# Patient Record
Sex: FEMALE | Race: OTHER | NOT HISPANIC OR LATINO
[De-identification: names, ages, dates, MRNs, and addresses within clinical notes are randomized per-mention and may not be internally consistent; named-entity substitution may affect disease eponyms.]

---

## 2021-02-07 ENCOUNTER — TRANSCRIPTION ENCOUNTER (OUTPATIENT)
Age: 36
End: 2021-02-07

## 2021-03-27 ENCOUNTER — APPOINTMENT (OUTPATIENT)
Dept: PLASTIC SURGERY | Facility: CLINIC | Age: 36
End: 2021-03-27
Payer: COMMERCIAL

## 2021-03-27 PROCEDURE — XXXXX: CPT

## 2021-03-29 PROBLEM — Z00.00 ENCOUNTER FOR PREVENTIVE HEALTH EXAMINATION: Status: ACTIVE | Noted: 2021-03-29

## 2021-04-08 ENCOUNTER — TRANSCRIPTION ENCOUNTER (OUTPATIENT)
Age: 36
End: 2021-04-08

## 2021-04-10 ENCOUNTER — APPOINTMENT (OUTPATIENT)
Dept: PLASTIC SURGERY | Facility: CLINIC | Age: 36
End: 2021-04-10
Payer: COMMERCIAL

## 2021-04-10 DIAGNOSIS — M20.009 UNSPECIFIED DEFORMITY OF UNSPECIFIED FINGER(S): ICD-10-CM

## 2021-04-10 DIAGNOSIS — Q70.9 SYNDACTYLY, UNSPECIFIED: ICD-10-CM

## 2021-04-10 PROCEDURE — XXXXX: CPT

## 2021-04-23 ENCOUNTER — TRANSCRIPTION ENCOUNTER (OUTPATIENT)
Age: 36
End: 2021-04-23

## 2022-01-03 ENCOUNTER — TRANSCRIPTION ENCOUNTER (OUTPATIENT)
Age: 37
End: 2022-01-03

## 2022-06-17 ENCOUNTER — OUTPATIENT (OUTPATIENT)
Dept: OUTPATIENT SERVICES | Facility: HOSPITAL | Age: 37
LOS: 1 days | End: 2022-06-17
Payer: MEDICAID

## 2022-06-17 DIAGNOSIS — Z3A.00 WEEKS OF GESTATION OF PREGNANCY NOT SPECIFIED: ICD-10-CM

## 2022-06-17 DIAGNOSIS — O26.899 OTHER SPECIFIED PREGNANCY RELATED CONDITIONS, UNSPECIFIED TRIMESTER: ICD-10-CM

## 2022-06-17 PROCEDURE — G0463: CPT

## 2022-06-17 PROCEDURE — 76815 OB US LIMITED FETUS(S): CPT

## 2022-06-17 PROCEDURE — 59025 FETAL NON-STRESS TEST: CPT

## 2022-06-17 PROCEDURE — 76815 OB US LIMITED FETUS(S): CPT | Mod: 26

## 2022-06-17 PROCEDURE — 76819 FETAL BIOPHYS PROFIL W/O NST: CPT | Mod: 26

## 2022-06-17 PROCEDURE — 76819 FETAL BIOPHYS PROFIL W/O NST: CPT

## 2022-06-20 ENCOUNTER — INPATIENT (INPATIENT)
Facility: HOSPITAL | Age: 37
LOS: 2 days | Discharge: HOME | End: 2022-06-23
Attending: OBSTETRICS & GYNECOLOGY | Admitting: OBSTETRICS & GYNECOLOGY
Payer: MEDICAID

## 2022-06-20 VITALS — HEIGHT: 64 IN | WEIGHT: 167.55 LBS

## 2022-06-20 DIAGNOSIS — O26.899 OTHER SPECIFIED PREGNANCY RELATED CONDITIONS, UNSPECIFIED TRIMESTER: ICD-10-CM

## 2022-06-20 DIAGNOSIS — Z3A.00 WEEKS OF GESTATION OF PREGNANCY NOT SPECIFIED: ICD-10-CM

## 2022-06-20 DIAGNOSIS — Z34.80 ENCOUNTER FOR SUPERVISION OF OTHER NORMAL PREGNANCY, UNSPECIFIED TRIMESTER: ICD-10-CM

## 2022-06-20 LAB
BASOPHILS # BLD AUTO: 0.03 K/UL — SIGNIFICANT CHANGE UP (ref 0–0.2)
BASOPHILS NFR BLD AUTO: 0.4 % — SIGNIFICANT CHANGE UP (ref 0–2)
EOSINOPHIL # BLD AUTO: 0.14 K/UL — SIGNIFICANT CHANGE UP (ref 0–0.5)
EOSINOPHIL NFR BLD AUTO: 1.9 % — SIGNIFICANT CHANGE UP (ref 0–6)
HCT VFR BLD CALC: 36.3 % — SIGNIFICANT CHANGE UP (ref 34.5–45)
HGB BLD-MCNC: 12.6 G/DL — SIGNIFICANT CHANGE UP (ref 11.5–15.5)
IMM GRANULOCYTES NFR BLD AUTO: 0.6 % — SIGNIFICANT CHANGE UP (ref 0–1.5)
LYMPHOCYTES # BLD AUTO: 2.14 K/UL — SIGNIFICANT CHANGE UP (ref 1–3.3)
LYMPHOCYTES # BLD AUTO: 29.7 % — SIGNIFICANT CHANGE UP (ref 13–44)
MCHC RBC-ENTMCNC: 33.1 PG — SIGNIFICANT CHANGE UP (ref 27–34)
MCHC RBC-ENTMCNC: 34.7 GM/DL — SIGNIFICANT CHANGE UP (ref 32–36)
MCV RBC AUTO: 95.3 FL — SIGNIFICANT CHANGE UP (ref 80–100)
MONOCYTES # BLD AUTO: 0.58 K/UL — SIGNIFICANT CHANGE UP (ref 0–0.9)
MONOCYTES NFR BLD AUTO: 8.1 % — SIGNIFICANT CHANGE UP (ref 2–14)
NEUTROPHILS # BLD AUTO: 4.27 K/UL — SIGNIFICANT CHANGE UP (ref 1.8–7.4)
NEUTROPHILS NFR BLD AUTO: 59.3 % — SIGNIFICANT CHANGE UP (ref 43–77)
NRBC # BLD: 0 /100 WBCS — SIGNIFICANT CHANGE UP (ref 0–0)
PLATELET # BLD AUTO: 187 K/UL — SIGNIFICANT CHANGE UP (ref 150–400)
RBC # BLD: 3.81 M/UL — SIGNIFICANT CHANGE UP (ref 3.8–5.2)
RBC # FLD: 13.4 % — SIGNIFICANT CHANGE UP (ref 10.3–14.5)
SARS-COV-2 RNA SPEC QL NAA+PROBE: SIGNIFICANT CHANGE UP
WBC # BLD: 7.2 K/UL — SIGNIFICANT CHANGE UP (ref 3.8–10.5)
WBC # FLD AUTO: 7.2 K/UL — SIGNIFICANT CHANGE UP (ref 3.8–10.5)

## 2022-06-20 RX ORDER — SODIUM CHLORIDE 9 MG/ML
1000 INJECTION, SOLUTION INTRAVENOUS
Refills: 0 | Status: DISCONTINUED | OUTPATIENT
Start: 2022-06-20 | End: 2022-06-21

## 2022-06-20 RX ORDER — OXYTOCIN 10 UNIT/ML
VIAL (ML) INJECTION
Qty: 30 | Refills: 0 | Status: DISCONTINUED | OUTPATIENT
Start: 2022-06-20 | End: 2022-06-21

## 2022-06-20 RX ORDER — CITRIC ACID/SODIUM CITRATE 300-500 MG
15 SOLUTION, ORAL ORAL EVERY 6 HOURS
Refills: 0 | Status: DISCONTINUED | OUTPATIENT
Start: 2022-06-20 | End: 2022-06-21

## 2022-06-20 RX ADMIN — SODIUM CHLORIDE 125 MILLILITER(S): 9 INJECTION, SOLUTION INTRAVENOUS at 22:00

## 2022-06-21 LAB
APTT BLD: 28.5 SEC — SIGNIFICANT CHANGE UP (ref 27.5–35.5)
BASOPHILS # BLD AUTO: 0.04 K/UL — SIGNIFICANT CHANGE UP (ref 0–0.2)
BASOPHILS NFR BLD AUTO: 0.4 % — SIGNIFICANT CHANGE UP (ref 0–2)
BLD GP AB SCN SERPL QL: SIGNIFICANT CHANGE UP
EOSINOPHIL # BLD AUTO: 0.09 K/UL — SIGNIFICANT CHANGE UP (ref 0–0.5)
EOSINOPHIL NFR BLD AUTO: 0.9 % — SIGNIFICANT CHANGE UP (ref 0–6)
FIBRINOGEN PPP-MCNC: 591 MG/DL — HIGH (ref 340–550)
HCT VFR BLD CALC: 36.8 % — SIGNIFICANT CHANGE UP (ref 34.5–45)
HGB BLD-MCNC: 12.7 G/DL — SIGNIFICANT CHANGE UP (ref 11.5–15.5)
IMM GRANULOCYTES NFR BLD AUTO: 0.4 % — SIGNIFICANT CHANGE UP (ref 0–1.5)
INR BLD: 0.88 RATIO — SIGNIFICANT CHANGE UP (ref 0.88–1.16)
LYMPHOCYTES # BLD AUTO: 2.07 K/UL — SIGNIFICANT CHANGE UP (ref 1–3.3)
LYMPHOCYTES # BLD AUTO: 21.7 % — SIGNIFICANT CHANGE UP (ref 13–44)
MCHC RBC-ENTMCNC: 32.6 PG — SIGNIFICANT CHANGE UP (ref 27–34)
MCHC RBC-ENTMCNC: 34.5 GM/DL — SIGNIFICANT CHANGE UP (ref 32–36)
MCV RBC AUTO: 94.6 FL — SIGNIFICANT CHANGE UP (ref 80–100)
MONOCYTES # BLD AUTO: 0.63 K/UL — SIGNIFICANT CHANGE UP (ref 0–0.9)
MONOCYTES NFR BLD AUTO: 6.6 % — SIGNIFICANT CHANGE UP (ref 2–14)
NEUTROPHILS # BLD AUTO: 6.68 K/UL — SIGNIFICANT CHANGE UP (ref 1.8–7.4)
NEUTROPHILS NFR BLD AUTO: 70 % — SIGNIFICANT CHANGE UP (ref 43–77)
NRBC # BLD: 0 /100 WBCS — SIGNIFICANT CHANGE UP (ref 0–0)
PLATELET # BLD AUTO: 171 K/UL — SIGNIFICANT CHANGE UP (ref 150–400)
PROTHROM AB SERPL-ACNC: 10.5 SEC — SIGNIFICANT CHANGE UP (ref 10.5–13.4)
RBC # BLD: 3.89 M/UL — SIGNIFICANT CHANGE UP (ref 3.8–5.2)
RBC # FLD: 13.3 % — SIGNIFICANT CHANGE UP (ref 10.3–14.5)
T PALLIDUM AB TITR SER: NEGATIVE — SIGNIFICANT CHANGE UP
WBC # BLD: 9.55 K/UL — SIGNIFICANT CHANGE UP (ref 3.8–10.5)
WBC # FLD AUTO: 9.55 K/UL — SIGNIFICANT CHANGE UP (ref 3.8–10.5)

## 2022-06-21 RX ORDER — DIPHENHYDRAMINE HCL 50 MG
25 CAPSULE ORAL EVERY 6 HOURS
Refills: 0 | Status: DISCONTINUED | OUTPATIENT
Start: 2022-06-21 | End: 2022-06-23

## 2022-06-21 RX ORDER — KETOROLAC TROMETHAMINE 30 MG/ML
30 SYRINGE (ML) INJECTION ONCE
Refills: 0 | Status: DISCONTINUED | OUTPATIENT
Start: 2022-06-21 | End: 2022-06-21

## 2022-06-21 RX ORDER — DIBUCAINE 1 %
1 OINTMENT (GRAM) RECTAL EVERY 6 HOURS
Refills: 0 | Status: DISCONTINUED | OUTPATIENT
Start: 2022-06-21 | End: 2022-06-23

## 2022-06-21 RX ORDER — ASCORBIC ACID 60 MG
500 TABLET,CHEWABLE ORAL DAILY
Refills: 0 | Status: DISCONTINUED | OUTPATIENT
Start: 2022-06-21 | End: 2022-06-23

## 2022-06-21 RX ORDER — TETANUS TOXOID, REDUCED DIPHTHERIA TOXOID AND ACELLULAR PERTUSSIS VACCINE, ADSORBED 5; 2.5; 8; 8; 2.5 [IU]/.5ML; [IU]/.5ML; UG/.5ML; UG/.5ML; UG/.5ML
0.5 SUSPENSION INTRAMUSCULAR ONCE
Refills: 0 | Status: DISCONTINUED | OUTPATIENT
Start: 2022-06-21 | End: 2022-06-23

## 2022-06-21 RX ORDER — IBUPROFEN 200 MG
600 TABLET ORAL EVERY 6 HOURS
Refills: 0 | Status: COMPLETED | OUTPATIENT
Start: 2022-06-21 | End: 2023-05-20

## 2022-06-21 RX ORDER — PRAMOXINE HYDROCHLORIDE 150 MG/15G
1 AEROSOL, FOAM RECTAL EVERY 4 HOURS
Refills: 0 | Status: DISCONTINUED | OUTPATIENT
Start: 2022-06-21 | End: 2022-06-23

## 2022-06-21 RX ORDER — HYDROCORTISONE 1 %
1 OINTMENT (GRAM) TOPICAL EVERY 6 HOURS
Refills: 0 | Status: DISCONTINUED | OUTPATIENT
Start: 2022-06-21 | End: 2022-06-23

## 2022-06-21 RX ORDER — LANOLIN
1 OINTMENT (GRAM) TOPICAL EVERY 6 HOURS
Refills: 0 | Status: DISCONTINUED | OUTPATIENT
Start: 2022-06-21 | End: 2022-06-23

## 2022-06-21 RX ORDER — INFLUENZA VIRUS VACCINE 15; 15; 15; 15 UG/.5ML; UG/.5ML; UG/.5ML; UG/.5ML
0.5 SUSPENSION INTRAMUSCULAR ONCE
Refills: 0 | Status: DISCONTINUED | OUTPATIENT
Start: 2022-06-21 | End: 2022-06-21

## 2022-06-21 RX ORDER — FERROUS SULFATE 325(65) MG
325 TABLET ORAL DAILY
Refills: 0 | Status: DISCONTINUED | OUTPATIENT
Start: 2022-06-21 | End: 2022-06-23

## 2022-06-21 RX ORDER — OXYCODONE HYDROCHLORIDE 5 MG/1
5 TABLET ORAL
Refills: 0 | Status: DISCONTINUED | OUTPATIENT
Start: 2022-06-21 | End: 2022-06-23

## 2022-06-21 RX ORDER — MAGNESIUM HYDROXIDE 400 MG/1
30 TABLET, CHEWABLE ORAL
Refills: 0 | Status: DISCONTINUED | OUTPATIENT
Start: 2022-06-21 | End: 2022-06-23

## 2022-06-21 RX ORDER — IBUPROFEN 200 MG
600 TABLET ORAL EVERY 6 HOURS
Refills: 0 | Status: DISCONTINUED | OUTPATIENT
Start: 2022-06-21 | End: 2022-06-23

## 2022-06-21 RX ORDER — SIMETHICONE 80 MG/1
80 TABLET, CHEWABLE ORAL EVERY 4 HOURS
Refills: 0 | Status: DISCONTINUED | OUTPATIENT
Start: 2022-06-21 | End: 2022-06-23

## 2022-06-21 RX ORDER — SODIUM CHLORIDE 9 MG/ML
3 INJECTION INTRAMUSCULAR; INTRAVENOUS; SUBCUTANEOUS EVERY 8 HOURS
Refills: 0 | Status: DISCONTINUED | OUTPATIENT
Start: 2022-06-21 | End: 2022-06-23

## 2022-06-21 RX ORDER — BENZOCAINE 10 %
1 GEL (GRAM) MUCOUS MEMBRANE EVERY 6 HOURS
Refills: 0 | Status: DISCONTINUED | OUTPATIENT
Start: 2022-06-21 | End: 2022-06-23

## 2022-06-21 RX ORDER — SODIUM CHLORIDE 9 MG/ML
1000 INJECTION, SOLUTION INTRAVENOUS
Refills: 0 | Status: DISCONTINUED | OUTPATIENT
Start: 2022-06-21 | End: 2022-06-23

## 2022-06-21 RX ORDER — ACETAMINOPHEN 500 MG
975 TABLET ORAL
Refills: 0 | Status: DISCONTINUED | OUTPATIENT
Start: 2022-06-21 | End: 2022-06-23

## 2022-06-21 RX ORDER — AER TRAVELER 0.5 G/1
1 SOLUTION RECTAL; TOPICAL EVERY 4 HOURS
Refills: 0 | Status: DISCONTINUED | OUTPATIENT
Start: 2022-06-21 | End: 2022-06-23

## 2022-06-21 RX ORDER — OXYTOCIN 10 UNIT/ML
333.33 VIAL (ML) INJECTION
Qty: 20 | Refills: 0 | Status: DISCONTINUED | OUTPATIENT
Start: 2022-06-21 | End: 2022-06-23

## 2022-06-21 RX ORDER — OXYCODONE HYDROCHLORIDE 5 MG/1
5 TABLET ORAL ONCE
Refills: 0 | Status: DISCONTINUED | OUTPATIENT
Start: 2022-06-21 | End: 2022-06-23

## 2022-06-21 RX ADMIN — Medication 1000 MILLIUNIT(S)/MIN: at 08:47

## 2022-06-21 RX ADMIN — Medication 0.2 MILLIGRAM(S): at 20:28

## 2022-06-21 RX ADMIN — SODIUM CHLORIDE 3 MILLILITER(S): 9 INJECTION INTRAMUSCULAR; INTRAVENOUS; SUBCUTANEOUS at 22:57

## 2022-06-21 RX ADMIN — Medication 0.2 MILLIGRAM(S): at 13:36

## 2022-06-21 RX ADMIN — Medication 975 MILLIGRAM(S): at 20:28

## 2022-06-21 RX ADMIN — Medication 6 MILLIUNIT(S)/MIN: at 00:57

## 2022-06-21 RX ADMIN — Medication 975 MILLIGRAM(S): at 10:33

## 2022-06-21 RX ADMIN — Medication 30 MILLIGRAM(S): at 10:31

## 2022-06-21 RX ADMIN — Medication 500 MILLIGRAM(S): at 13:36

## 2022-06-21 RX ADMIN — Medication 325 MILLIGRAM(S): at 13:36

## 2022-06-21 RX ADMIN — Medication 975 MILLIGRAM(S): at 21:20

## 2022-06-21 RX ADMIN — Medication 0.25 MILLIGRAM(S): at 08:49

## 2022-06-21 RX ADMIN — Medication 1 TABLET(S): at 13:36

## 2022-06-22 ENCOUNTER — TRANSCRIPTION ENCOUNTER (OUTPATIENT)
Age: 37
End: 2022-06-22

## 2022-06-22 RX ORDER — SENNA PLUS 8.6 MG/1
2 TABLET ORAL AT BEDTIME
Refills: 0 | Status: DISCONTINUED | OUTPATIENT
Start: 2022-06-22 | End: 2022-06-23

## 2022-06-22 RX ORDER — DOCUSATE SODIUM 100 MG
1 CAPSULE ORAL
Qty: 60 | Refills: 0
Start: 2022-06-22 | End: 2022-07-21

## 2022-06-22 RX ORDER — IBUPROFEN 200 MG
1 TABLET ORAL
Qty: 120 | Refills: 0
Start: 2022-06-22 | End: 2022-07-21

## 2022-06-22 RX ADMIN — Medication 600 MILLIGRAM(S): at 12:59

## 2022-06-22 RX ADMIN — SODIUM CHLORIDE 3 MILLILITER(S): 9 INJECTION INTRAMUSCULAR; INTRAVENOUS; SUBCUTANEOUS at 14:00

## 2022-06-22 RX ADMIN — SODIUM CHLORIDE 3 MILLILITER(S): 9 INJECTION INTRAMUSCULAR; INTRAVENOUS; SUBCUTANEOUS at 06:33

## 2022-06-22 RX ADMIN — Medication 500 MILLIGRAM(S): at 12:28

## 2022-06-22 RX ADMIN — Medication 1 TABLET(S): at 12:28

## 2022-06-22 RX ADMIN — Medication 0.2 MILLIGRAM(S): at 01:58

## 2022-06-22 RX ADMIN — Medication 0.2 MILLIGRAM(S): at 12:27

## 2022-06-22 RX ADMIN — Medication 325 MILLIGRAM(S): at 12:28

## 2022-06-22 RX ADMIN — Medication 600 MILLIGRAM(S): at 12:29

## 2022-06-22 RX ADMIN — SENNA PLUS 2 TABLET(S): 8.6 TABLET ORAL at 23:23

## 2022-06-22 RX ADMIN — SODIUM CHLORIDE 3 MILLILITER(S): 9 INJECTION INTRAMUSCULAR; INTRAVENOUS; SUBCUTANEOUS at 22:39

## 2022-06-22 NOTE — DISCHARGE NOTE OB - FLU SEASON?
FLOATERS (and flashes in side vision)    Floaters are little specks, strands, or cobweb-like spots that float around in your field of vision.  They may move as your eyes move, and then dart away when you try to look at them.  In most cases, floaters are simply an annoyance and are part of the natural aging process of the eyes.  While floaters may be noticed at any age, they are more common as we get older because the vitreous (the clear liquid gel inside the eye) becomes more watery.  The membranes that are embedded in the vitreous may also break loose and start to float around.    Sometimes a section of the vitreous pulls its own fine fibers away from the retina (the delicate lining of the eye) very quickly rather than gradually.  Flashes in the side vision sometimes result when this process is occurring.  When this happens without tearing the retina, it is called a VITREOUS DETACHMENT.  This is a frequent occurrence and is  part of the normal aging process of the eye.  In most cases, it is not sight threatening and does not require treatment.    However a SUDDEN increase in floaters, possibly accompanied by light flashes or side vision loss could indicate a retinal tear or detachment.  A retinal detachment occurs when any part of the retina (the eye's light sensitive tissue/lining) is pulled away from the wall of the eye.  A retinal detachment is a serious condition and should always be considered an emergency.  If untreated, it can lead to permanent vision impairment.    Those who have a SUDDEN increase in floaters, peripheral light flashes, or peripheral vision loss should be checked as soon as possible.  For those who have floaters that are simply annoying, no treatment is recommended.   Yes...

## 2022-06-22 NOTE — DISCHARGE NOTE OB - PATIENT PORTAL LINK FT
You can access the FollowMyHealth Patient Portal offered by [facility] by registering at the following website: [email or web address]. By joining [email or web address]’s [email or web address] portal, you will also be able to view your health information using other applications (apps) compatible with our system.

## 2022-06-22 NOTE — DISCHARGE NOTE OB - CARE PROVIDER_API CALL
Aston Rodriguez  OBSTETRICS AND GYNECOLOGY  [address], [address]  [place] [address]  Phone: [phone]  Fax: [phone]  Follow Up Time: 1 month

## 2022-06-22 NOTE — DISCHARGE NOTE OB - MEDICATION SUMMARY - MEDICATIONS TO TAKE
I will START or STAY ON the medications listed below when I get home from the hospital:    ibuprofen 600 mg oral tablet  -- 1 tab(s) by mouth every 6 hours  -- Do not take this drug if you are pregnant.  It is very important that you take or use this exactly as directed.  Do not skip doses or discontinue unless directed by your doctor.  May cause drowsiness or dizziness.  Obtain medical advice before taking any non-prescription drugs as some may affect the action of this medication.  Take with food or milk.    -- Indication: For pain    Colace 100 mg oral capsule  -- 1 cap(s) by mouth 2 times a day  -- Medication should be taken with plenty of water.    -- Indication: For constipation

## 2022-06-22 NOTE — LACTATION INITIAL EVALUATION - LACTATION INTERVENTIONS
Breastfeeding on cue 8-12X/24 hours with diaper count to assess for adequate intake, safe skin to skin and rooming-in encouraged./initiate/review safe skin-to-skin/initiate/review hand expression/reviewed risks of unnecessary formula supplementation/reviewed benefits and recommendations for rooming in/reviewed feeding on demand/by cue at least 8 times a day

## 2022-06-22 NOTE — DISCHARGE NOTE OB - MATERIALS PROVIDED
Vaccinations/[facility]  Screening Program/  Immunization Record/Breastfeeding Log/Bottle Feeding Log/Breastfeeding Mother’s Support Group Information/Guide to Postpartum Care/[facility] Hearing Screen Program/Back To Sleep Handout/Shaken Baby Prevention Handout/Breastfeeding Guide and Packet/Birth Certificate Instructions/Discharge Medication Information for Patients and Families Pocket Guide/Prescription for Breast Pump/Tdap Vaccination (VIS Pub Date: 2012)

## 2022-06-22 NOTE — PROGRESS NOTE ADULT - SUBJECTIVE AND OBJECTIVE BOX
Patient seen at bedside resting comfortably offers no current complaints. Ambulating and voiding without difficulty. Passing flatus and tolerating regular diet.  both breast/bottle feeding . Denies HA, CP, SOB, N/V/D, dizziness, palpitations,  worsening vaginal bleeding, or any other concerns.      Vital Signs Last 24 Hrs  T(C): 36.7 (2022 03:23), Max: 37 (2022 15:30)  T(F): 98.1 (2022 03:23), Max: 98.6 (2022 15:30)  HR: 73 (2022 03:23) (73 - 86)  BP: 120/70 (2022 03:23) (103/68 - 121/72)  BP(mean): --  RR: 18 (2022 03:23) (17 - 18)  SpO2: 96% (2022 03:23) (96% - 97%)    Physical Exam:     Gen: A&Ox 3, NAD  Chest: CTA B/L  Cardiac: S1,S2; RRR  Breast: Soft, nontender, nonengorged  Abdomen: +BS, Soft, nontender, ND; Fundus firm below umbilicus  Gyn: mod lochia  Ext: Nontender, DTRS 2+, no worsening edema                          12.7   9.55  )-----------( 171      ( 2022 21:03 )             36.8       A/P:  PPD#1 s/p   -Continue pain management  -Encourage OOB and ambulation  -Check CBC  -Continue current care  -Plan for discharge tomorrow  -d/w dr foy

## 2022-06-23 VITALS
RESPIRATION RATE: 18 BRPM | HEART RATE: 63 BPM | DIASTOLIC BLOOD PRESSURE: 73 MMHG | SYSTOLIC BLOOD PRESSURE: 113 MMHG | TEMPERATURE: 98 F | OXYGEN SATURATION: 98 %

## 2022-06-23 RX ORDER — INFLUENZA VIRUS VACCINE 15; 15; 15; 15 UG/.5ML; UG/.5ML; UG/.5ML; UG/.5ML
0.5 SUSPENSION INTRAMUSCULAR ONCE
Refills: 0 | Status: COMPLETED | OUTPATIENT
Start: 2022-06-23 | End: 2022-06-23

## 2022-06-23 RX ADMIN — Medication 600 MILLIGRAM(S): at 04:40

## 2022-06-23 RX ADMIN — Medication 600 MILLIGRAM(S): at 04:10

## 2022-06-23 RX ADMIN — SODIUM CHLORIDE 3 MILLILITER(S): 9 INJECTION INTRAMUSCULAR; INTRAVENOUS; SUBCUTANEOUS at 05:51

## 2022-06-23 NOTE — LACTATION INITIAL EVALUATION - LACTATION INTERVENTIONS
provided with Manual pump for use at home when needed at pt's request; Reinforced benefits of  exclusive breastfeeding for first 6 months and provided with breastfeeding community resource list for breastfeeding support/assistance available post-discharge and of importance of early f/u with pediatrician within 2-3 days./initiate/review pumping guidelines and safe milk handling/post discharge community resources provided/review techniques to manage sore nipples/engorgement/reviewed components of an effective feeding and at least 8 effective feedings per day required/reviewed importance of monitoring infant diapers, the breastfeeding log, and minimum output each day/reviewed benefits and recommendations for rooming in/reviewed feeding on demand/by cue at least 8 times a day/reviewed indications of inadequate milk transfer that would require supplementation

## 2022-06-23 NOTE — PROGRESS NOTE ADULT - SUBJECTIVE AND OBJECTIVE BOX
Patient seen resting comfortably.  No current complaints.  Denies HA, CP, SOB, N/V/D, dizziness, palpitations, worsening abdominal pain, worsening vaginal bleeding, or any other concerns.  Ambulating and voiding without difficulty.  Passing flatus and tolerating regular diet.  Attempting breastfeeding.     Vital Signs Last 24 Hrs  T(C): 36.6 (2022 05:26), Max: 36.6 (2022 18:54)  T(F): 97.9 (2022 05:26), Max: 97.9 (2022 05:26)  HR: 63 (2022 05:26) (63 - 71)  BP: 113/73 (2022 05:26) (113/73 - 118/73)  BP(mean): 86 (2022 05:26) (86 - 86)  RR: 18 (2022 05:26) (16 - 18)  SpO2: 98% (2022 05:26) (98% - 98%)    Physical Exam:     Gen: A&Ox 3, NAD  Chest: CTABL  Cardiac: S1+S2+ RRR  Breast: Soft, nontender, nonengorged  Abdomen: Soft, nontender, Fundus firm below umbilicus, +BS  Gyn: Mild lochia, intact/repaired  Extremities: Nontender, DTRS 2+, no worsening edema                           12.7   9.55  )-----------( 171      ( 2022 21:03 )             36.8            A/P: Patient is a 37 year old P5 s/p . Postpartum day two in stable condition     - Discharge home with instructions  -Follow up in office in 5-6 weeks for postpartum visit  -Breastfeeding encouraged   -Continue pain management  -Encourage OOB and ambulation  -Continue current care      Attending aware

## 2022-06-23 NOTE — LACTATION INITIAL EVALUATION - ADDITIONAL HEALTH HISTORY COMMENTS
Bilat. Tubal Ligation 10 yrs ago with reversal in 2021
Bilat. Tubal Ligation 10 yrs ago with reversal in 2021

## 2022-06-23 NOTE — LACTATION INITIAL EVALUATION - INTERVENTION OUTCOME
verbalizes understanding/demonstrates understanding of teaching
verbalizes understanding/demonstrates understanding of teaching/needs met

## 2022-08-11 PROCEDURE — 85384 FIBRINOGEN ACTIVITY: CPT

## 2022-08-11 PROCEDURE — 59050 FETAL MONITOR W/REPORT: CPT

## 2022-08-11 PROCEDURE — 36415 COLL VENOUS BLD VENIPUNCTURE: CPT

## 2022-08-11 PROCEDURE — 87635 SARS-COV-2 COVID-19 AMP PRB: CPT

## 2022-08-11 PROCEDURE — 86780 TREPONEMA PALLIDUM: CPT

## 2022-08-11 PROCEDURE — 85025 COMPLETE CBC W/AUTO DIFF WBC: CPT

## 2022-08-11 PROCEDURE — 85730 THROMBOPLASTIN TIME PARTIAL: CPT

## 2022-08-11 PROCEDURE — 59025 FETAL NON-STRESS TEST: CPT

## 2022-08-11 PROCEDURE — 86901 BLOOD TYPING SEROLOGIC RH(D): CPT

## 2022-08-11 PROCEDURE — G0463: CPT

## 2022-08-11 PROCEDURE — 85610 PROTHROMBIN TIME: CPT

## 2022-08-11 PROCEDURE — 86850 RBC ANTIBODY SCREEN: CPT

## 2022-08-11 PROCEDURE — 86900 BLOOD TYPING SEROLOGIC ABO: CPT

## 2022-08-11 PROCEDURE — 86923 COMPATIBILITY TEST ELECTRIC: CPT

## 2024-02-19 ENCOUNTER — EMERGENCY (EMERGENCY)
Facility: HOSPITAL | Age: 39
LOS: 1 days | Discharge: HOME | End: 2024-02-19
Attending: STUDENT IN AN ORGANIZED HEALTH CARE EDUCATION/TRAINING PROGRAM
Payer: MEDICAID

## 2024-02-19 VITALS
OXYGEN SATURATION: 98 % | HEIGHT: 63 IN | SYSTOLIC BLOOD PRESSURE: 123 MMHG | TEMPERATURE: 99 F | WEIGHT: 188.05 LBS | RESPIRATION RATE: 16 BRPM | DIASTOLIC BLOOD PRESSURE: 83 MMHG | HEART RATE: 77 BPM

## 2024-02-19 PROCEDURE — 99284 EMERGENCY DEPT VISIT MOD MDM: CPT

## 2024-02-19 PROCEDURE — 99283 EMERGENCY DEPT VISIT LOW MDM: CPT

## 2024-02-19 NOTE — ED ADULT TRIAGE NOTE - LANGUAGE ASSISTANCE NEEDED
NOTIFICATION RETURN TO WORK / SCHOOL 
 
10/29/2018 12:09 PM 
 
Ms. Julaine Castleman [place] [address] [place] [phone] To Whom It May Concern: 
 
Julaine Castleman is currently under the care of [address]. She will return to work/school on: 10/31/2018 If there are questions or concerns please have the patient contact our office. Sincerely, Hema Cannon NP 
 
                                
 

Yes-Patient/Caregiver accepts free interpretation services...

## 2024-02-20 RX ORDER — OXYCODONE AND ACETAMINOPHEN 5; 325 MG/1; MG/1
1 TABLET ORAL
Qty: 4 | Refills: 0
Start: 2024-02-20 | End: 2024-02-23

## 2024-02-20 RX ORDER — CIPROFLOXACIN AND DEXAMETHASONE 3; 1 MG/ML; MG/ML
4 SUSPENSION/ DROPS AURICULAR (OTIC)
Qty: 1 | Refills: 0
Start: 2024-02-20 | End: 2024-02-26

## 2024-02-20 NOTE — ED PROVIDER NOTE - CLINICAL SUMMARY MEDICAL DECISION MAKING FREE TEXT BOX
40 yo F no med hx presenting with R ear pain x 6 hours. Patient states she felt something moving earlier and severe pain but now symptoms improved. No fever/chills. +erythematous auditory canal with fluid noted R ear, no cone reflex c/f otitis media. Possible irriation from possible earlier foreign body no longer present. Will send cipro-dex drops as well to decrease pain/swelling. Will send abx for otitis media and analgesia, pt instructed to follow up with ENT.

## 2024-02-20 NOTE — ED PROVIDER NOTE - NSFOLLOWUPINSTRUCTIONS_ED_ALL_ED_FT
– Return for any worsening or concerning symptoms (see below).  – Follow up with primary care doctor in the next 5 to 7 days.   – Follow up with ENT (ears nose throat) doctor in 2 to 3 days.   – You were prescribed 2 antibiotics: augmentin, take twice a day for 7 days. Ciprodex drops: apply 4 gtt in the right ear, twice a day for 7 days. You were prescribed percocet for severe pain. Take only at night for severe pain if motrin and tylenol are insufficient to control your symptoms. Do not drink or drive while taking this medication.     Ear Infection    WHAT YOU NEED TO KNOW:    An ear infection is also called otitis media. Blocked or swollen eustachian tubes can cause an infection. Eustachian tubes connect the middle ear to the back of the nose and throat. They drain fluid from the middle ear. You may have a buildup of fluid in your ear. Germs build up in the fluid and infection develops.  Ear Anatomy    DISCHARGE INSTRUCTIONS:    Return to the emergency department if:    You have clear fluid coming from your ear.    You have a stiff neck, headache, and a fever.  Call your doctor if:    You see blood or pus draining from your ear.    Your ear pain gets worse or does not go away, even after treatment.    The outside of your ear is red or swollen.    You are vomiting or have diarrhea.    You have questions or concerns about your condition or care.  Medicines: You may need any of the following:    Acetaminophen decreases pain and fever. It is available without a doctor's order. Ask how much to take and how often to take it. Follow directions. Read the labels of all other medicines you are using to see if they also contain acetaminophen, or ask your doctor or pharmacist. Acetaminophen can cause liver damage if not taken correctly.    NSAIDs, such as ibuprofen, help decrease swelling, pain, and fever. This medicine is available with or without a doctor's order. NSAIDs can cause stomach bleeding or kidney problems in certain people. If you take blood thinner medicine, always ask your healthcare provider if NSAIDs are safe for you. Always read the medicine label and follow directions.    Ear drops may contain medicine to decrease pain and inflammation.    Antibiotics help treat a bacterial infection.    Take your medicine as directed. Contact your healthcare provider if you think your medicine is not helping or if you have side effects. Tell your provider if you are allergic to any medicine. Keep a list of the medicines, vitamins, and herbs you take. Include the amounts, and when and why you take them. Bring the list or the pill bottles to follow-up visits. Carry your medicine list with you in case of an emergency.  Self-care:    Apply heat on your ear for 15 to 20 minutes, 3 to 4 times a day or as directed. You can apply heat with an electric heating pad, hot water bottle, or warm compress. Always put a cloth between your skin and the heat pack to prevent burns. Heat helps decrease pain.    Apply ice on your ear for 15 to 20 minutes, 3 to 4 times a day for 2 days or as directed. Use an ice pack, or put crushed ice in a plastic bag. Cover it with a towel before you apply it to your ear. Ice decreases swelling and pain.  Prevent an ear infection:    Wash your hands often to help prevent the spread of germs. Ask everyone in your house to wash their hands with soap and water. Ask them to wash after they use the bathroom or change a diaper. Remind them to wash before they prepare or eat food.  Handwashing      Stay away from people who are ill. Some germs spread easily and quickly through contact.  Follow up with your doctor as directed: Write down your questions so you remember to ask them during your visits.

## 2024-02-20 NOTE — ED PROVIDER NOTE - PHYSICAL EXAMINATION
Gen: NAD; well appearing  ENT: mucous membranes moist, no discharge. +erythematous auditory canal with fluid noted R ear, no cone reflex  Neck: neck supple

## 2024-02-20 NOTE — ED PROVIDER NOTE - OBJECTIVE STATEMENT
40 yo F no med hx presenting with R ear pain x 6 hours. Patient states she felt something moving earlier and severe pain but now symptoms improved. No fever/chills.

## 2024-02-20 NOTE — ED PROVIDER NOTE - PATIENT PORTAL LINK FT
You can access the FollowMyHealth Patient Portal offered by [facility] by registering at the following website: [email or web address]. By joining [employer]’s FollowMyHealth portal, you will also be able to view your health information using other applications (apps) compatible with our system.

## 2024-02-20 NOTE — ED ADULT NURSE NOTE - NSFALLUNIVINTERV_ED_ALL_ED
Bed/Stretcher in lowest position, wheels locked, appropriate side rails in place/Call bell, personal items and telephone in reach/Instruct patient to call for assistance before getting out of bed/chair/stretcher/Non-slip footwear applied when patient is off stretcher/[place] to call system/Physically safe environment - no spills, clutter or unnecessary equipment/Purposeful proactive rounding/Room/bathroom lighting operational, light cord in reach

## 2024-02-20 NOTE — ED PROVIDER NOTE - NSFOLLOWUPCLINICS_GEN_ALL_ED_FT
[facility]  Otolaryngology (ENT)  [address], [address]  [place] [address]  Phone: [phone]  Fax:     [facility] - ENT  Otolaryngology (ENT)  [address] [place] [address]  Phone: [phone]  Fax:     [facility]  Otolaryngology (ENT)  [address], [facility] - 10th [place] [address]  Phone: [phone]  Fax:

## 2024-10-25 NOTE — DISCHARGE NOTE OB - CAREGIVER ADDRESS
"History of Present Illness:  Bessie Carlisle is a 13 y.o. female with no formal psychiatric history was brought to [facility] ED by her mother for suicidal ideations.     On interview, patient reports feeling increasingly depressed, with persistent suicidal thoughts over the past two years, which she attributes to the start of her bullying at school. She noted that her suicidal ideation has recently intensified, leading her to formulate an active plan.  Yesterday, she contemplated ingesting antihistamine medication with the intent of overdose, but ultimately thought of her family and did not proceed with these thoughts. she disclosed that two weeks ago, she attempted to end her life by cutting her wrist. Additionally, on 10/20, she ingested approximately 7 to 8 antihistamine tablets with the intention of overdosing and stated that she repeated this behavior today, taking 4 more tablets of the same antihistamine, which she is prescribed for seasonal allergies and typically takes regularly.     The patient described experiencing low energy, lack of motivation, decreased appetite, and insomnia with difficulty staying asleep. She also expressed feelings of guilt, particularly about her mother’s heart disease, stating, \"I am the reason for her heart disease.\" She also worries over her mother's smoking habits. Over recent weeks, she endorses depressed mood, anhedonia, disrupted sleep, diminished appetite, passive SI. She denies over worrying but is anxious around large crowds. Currently, she denies any suicidal or homicidal ideations, plans, or intent. The patient has never consulted with a psychiatrist or been evaluated by one, has not been prescribed any psychotropic medications, and has no other medical conditions aside from seasonal allergies. She has engaged in self harming behavior since age 7, mostly scratching. She reports cutting less than once a month, last cut two weeks ago.  She largely denies history of visual " hallucinations, describing a hypnagogic hallucination once several years ago.  She admits to auditory hallucinations that consisted of negative voices occasionally commanding her to harm herself.  These are ego-dystonic but have contributed to previous attempts and cutting behavior.  Voices are not her own, nor familiar.  She follows with a therapist at [facility] weekly, but does not disclose many details about her mental health struggles, keeping interactions mostly superficial.  Because of this, has not derived much therapeutic benefit, but would like to keep with therapy.     Collateral was obtained from patient's mother, Elena Juarez; [phone]). The mother was tearful throughout the interview, expressing shock over her daughter’s worsening depression, which she had not fully realized. She explained that she was unaware of her daughter's ingestion of Allegra antihistamine tablets, as this had not been disclosed to her. The mother shared that her daughter has been subjected to severe bullying at school over the past year, during which time she noticed increasing behavioral issues. In response, she sought professional help, arranging for her daughter to see a therapist weekly, with whom she has been consistent in attending sessions. However, it now seems that her daughter has not been fully open in therapy. The mother further mentioned that two weeks ago, her daughter had cut her wrist, and today she discovered a note dated 10/23, suggesting ongoing suicidal thoughts and disclosing the ingestion of 7-8 antihistamine tablets on 10/20. The note also indicated an intention to cut her wrist again, warning that the next attempt would be more severe.     Mother reports bullying started many years ago prior to 5th grade, so mother had her switch schools. Then online school occurred during covid pandemic, removing socialization. She then started at traditional school again this August, and bullying occurred again  without support from her administration. Transferred schools this October back to [employer] (original school prior to 5th grade), which has been positive for Bessie and her twin brother Sumanth as they are familiar with the teachers, and previous bully towards Bessie no longer attends.  Mother reports that the family lives in a single parent household, completely supported by herself.  They are stable financially, even after mother had to scale to part time in order to devote more care to her twins in the home, Bessie and Sumanth.  She cares deeply about her children and dedicates time strictly to work, and care of children since divorce 10 years ago.  Denies psychiatric history in patient's older sisters, and reports they were never on psychiatric medications.  After discussing risks and benefits, she is amenable to Bessie trialing antidepressant regimen.      Past Psychiatric History:  Current/Previous Diagnoses: None reported  Current Psychiatrist/Psychiatric Provider: None reported  Current Therapist: sees a therapist at [facility], started in 2023 and follow up with the therapist once weekly   Other Providers/Agencies: None reported   Outpatient Treatment History: None reported  Past Medication Trials: None reported  Inpatient Hospitalizations: None reported  Suicide Attempts: Ingestion of OTC antihistamine on 10/20 and 10/25  Self-Injurious Behaviors: history of cutting, last episode was two weeks ago   History of Violence: None reported    Past Medical History:  She  has no past medical history on file.    Diagnoses: None reported  Allergies:   Allergies as of 10/25/2024    (No Known Allergies)      Past Hospitalizations: None reported  Past Surgical History: Guardian/Parent denies  History of Seizures/LOC: None reported    Developmental History:  born full term through a  delivery, with no complications during pregnancy or childbirth, no in-utero exposure, and experienced normal childhood  development, reaching milestones on schedule.     Family Psychiatric History:  Psychiatric Disorders: None reported  Suicide: None reported  Substance Abuse: None reported    Surgical History:  She has no past surgical history on file.    OB/GYN/Sexual History:  History of Pregnancy: None reported   History of STIs: None reported   Contraceptive Use: None reported   Gynecologic History: None reported   Menstrual History (onset, frequency, length, LMP): started at age of 9; once monthly; LMP 10/25/24  Sexually Active: None reported     Social History:  Guardian: Mother (Elena Juarez; [phone])   Household Members: Patient lives at home with mother and twin brother   Family Relationships: Patient reports good relationships with family members  Abuse/Neglect/DCFS: Patient denies history of abuse/neglect. Patient denies DCFS involvement.  Access to Weapons: : there is a firearm present within the premises; however, the ammunition is stored independently from the weapon. Moreover, the ammunition is securely locked away, with the key being inaccessible to the individual in question.   Employment: Patient denies current employment  Sexual Orientation: Heterosexual  Pronouns: She/her/hers  Current Relationships: Patient denies any current romantic relationships  Social Support: Patient endorses positive support from parents and friends  Recent Stressors: bullying at school   Interests/Strengths: Doodling, read, playing the piano, drawing  Legal: Patient denies any current or previous issues with the law.    School History:  School: Patient is currently a 9th grader at [employer].  Reenrolled in October 2024.  Academic History: Patient and parent report good grades. Patient and parent deny the presence of an IEP or 504 plan.  Academic Discipline: Patient and parent deny a history of suspensions or expulsions.    Substance Use History:  Tobacco Use History: None  "reported  Alcohol Use History: None reported  Cannabis Use History: None reported  Illicit Drug Use History: None reported    Review of Systems    Psychiatric Review of Systems:  Depressive Symptoms: depressed or irritable mood, insomnia or hypersomnia, worthlessness or guilt, poor concentration or indecisiveness, and suicidal ideation or plan , anhedonia  Manic Symptoms: negative  Anxiety Symptoms: Denies excessive worrying, restlessness, muscle tension.  Endorses some irritability.   Disordered Eating Symptoms: negative   Inattentive Symptoms: negative   Hyperactive/Impulsive Symptoms: negative screen  Psychotic Symptoms: Endorses auditory hallucinations as stated in HPI.  Denies paranoia, no delusions elicited.  Developmental Concerns: negative   Delirium/Altered Mental Status Symptoms: negative   Other Symptoms/Concerns: negative     Current Medications:  No current facility-administered medications for this encounter.  No current outpatient medications on file.    Allergies:  Patient has no known allergies.    Vital Signs:  BP (!) 141/86   Pulse 97   Temp 36.7 °C (98.1 °F)   Resp 18   Ht 1.61 m (5' 3.39\")   Wt 78.6 kg   SpO2 100%   BMI 30.32 kg/m²   Body mass index is 30.32 kg/m².  97 %ile (Z= 1.95) based on CDC (Girls, 2-20 Years) BMI-for-age based on BMI available on 10/25/2024.  Wt Readings from Last 4 Encounters:   10/25/24 78.6 kg (98%, Z= 2.05)*   08/29/22 70 kg (>99%, Z= 2.36)*   02/28/20 41 kg (96%, Z= 1.72)*     * Growth percentiles are based on CDC (Girls, 2-20 Years) data.       Mental Status Exam:  General: Seated comfortably in no acute distress.  Appearance: Appears stated age, appropriately dressed/groomed, in hospital gown  Attitude: Pleasant and cooperative; guarded but warm.  Behavior: Fair eye contact; thoroughly engaged in interview.  Motor Activity: No significant psychomotor agitation or slowing.  No noticeable motor tricks or tremors  Speech: Clear, with fair phonation, and no lisp " "nor dysarthria.   Mood: \"Okay\"  Affect: Mostly euthymic, with mood congruent sadness, tearfulness at times.  Thought Process: Linear and logical; not perseverating   Thought Content: Denied SI/HI. Not voicing/endorsing delusions.  No paranoia elicited  Thought Perception: Did not appear to be responding to internal stimuli. Not endorsing AVH  Cognition: Grossly intact; A&O x4/4 to self, place, date, and context.  Fair fund of knowledge, attention and concentration, memory.  Insight: Fair, as evidenced by understanding of worsening depressive symptoms and motivation for treatment  Judgment: Limited as evidenced by recent suicidal behavior, and acute psychiatric illness     Physical Exam    Cranial Nerve Exam:  I: smell Normal    II: visual acuity  Grossly visually responsive to cues and confrontation. Tracking intact to 4 quadrants.   II: visual fields Full to confrontation   II: pupils Equal, round, reactive to light   III,IV,VI: extraocular muscles  Full ROM   V: facial light touch sensation  Grossly intact and symmetric to light touch bilaterally   VII: facial muscle function - upper  Normal eye raise and forehead raise. No ptosis.   VII: facial muscle function - lower Normal cheek puff and symmetric lips   VIII: hearing Normal    IX: soft palate elevation  Normal   X: symmetric  swallow and pharynx clearing Present   XI: trapezius strength  5/5   XI: sternocleidomastoid strength 5/5   XI: neck flexion strength  5/5   XII: tongue strength  Normal, symmetric without deviation     Constitutional:       Appearance: Normal appearance.   HENT:      Head: Normocephalic.      Right Ear: External ear normal.      Left Ear: External ear normal.      Nose: No congestion.   Eyes:      Conjunctiva/sclera: Conjunctivae normal.   Cardiovascular: Normal rate and rhythm, normal heart sounds auscultated.     Comments: Appear well perfused.  Pulmonary: Clear to auscultation bilaterally     Effort: Pulmonary effort is normal. No " respiratory distress.   Abdominal: No distention, normal bowel sounds, nontender to palpation     General: There is no distension.   Musculoskeletal:         General: No deformity.   Skin:     Comments: No notable scars on forearms  Neurological:      General: No focal deficit present.      Mental Status: She is alert. Mental status is at baseline.      Relevant Results:  Results for orders placed or performed during the hospital encounter of 10/25/24 (from the past 24 hours)   Comprehensive Metabolic Panel   Result Value Ref Range    Glucose 99 74 - 99 mg/dL    Sodium 136 136 - 145 mmol/L    Potassium 4.1 3.5 - 5.3 mmol/L    Chloride 107 98 - 107 mmol/L    Bicarbonate 14 (L) 18 - 27 mmol/L    Anion Gap 19 10 - 30 mmol/L    Urea Nitrogen 9 6 - 23 mg/dL    Creatinine 0.57 0.50 - 1.00 mg/dL    eGFR      Calcium 9.1 8.5 - 10.7 mg/dL    Albumin 4.4 3.4 - 5.0 g/dL    Alkaline Phosphatase 106 52 - 239 U/L    Total Protein 8.0 (H) 6.2 - 7.7 g/dL    AST 20 9 - 24 U/L    Bilirubin, Total 0.4 0.0 - 0.9 mg/dL    ALT 8 3 - 28 U/L   Lipid Panel   Result Value Ref Range    Cholesterol 194 0 - 199 mg/dL    HDL-Cholesterol 52.3 mg/dL    Cholesterol/HDL Ratio 3.7     LDL Calculated 130 (H) <=109 mg/dL    VLDL 12 0 - 40 mg/dL    Triglycerides 58 0 - 149 mg/dL    Non HDL Cholesterol 142 (H) 0 - 119 mg/dL   TSH with reflex to Free T4 if abnormal   Result Value Ref Range    Thyroid Stimulating Hormone 0.77 0.67 - 3.90 mIU/L   Vitamin D 25-Hydroxy,Total (for eval of Vitamin D levels)   Result Value Ref Range    Vitamin D, 25-Hydroxy, Total 21 (L) 30 - 100 ng/mL   Urinalysis with Reflex Microscopic   Result Value Ref Range    Color, Urine Light-Yellow Light-Yellow, Yellow, Dark-Yellow    Appearance, Urine Clear Clear    Specific Gravity, Urine 1.015 1.005 - 1.035    pH, Urine 6.0 5.0, 5.5, 6.0, 6.5, 7.0, 7.5, 8.0    Protein, Urine NEGATIVE NEGATIVE, 10 (TRACE), 20 (TRACE) mg/dL    Glucose, Urine Normal Normal mg/dL    Blood, Urine  NEGATIVE NEGATIVE    Ketones, Urine NEGATIVE NEGATIVE mg/dL    Bilirubin, Urine NEGATIVE NEGATIVE    Urobilinogen, Urine Normal Normal mg/dL    Nitrite, Urine NEGATIVE NEGATIVE    Leukocyte Esterase, Urine NEGATIVE NEGATIVE   Drug Screen, Urine   Result Value Ref Range    Amphetamine Screen, Urine Presumptive Negative Presumptive Negative    Barbiturate Screen, Urine Presumptive Negative Presumptive Negative    Benzodiazepines Screen, Urine Presumptive Negative Presumptive Negative    Cannabinoid Screen, Urine Presumptive Negative Presumptive Negative    Cocaine Metabolite Screen, Urine Presumptive Negative Presumptive Negative    Fentanyl Screen, Urine Presumptive Negative Presumptive Negative    Opiate Screen, Urine Presumptive Negative Presumptive Negative    Oxycodone Screen, Urine Presumptive Negative Presumptive Negative    PCP Screen, Urine Presumptive Negative Presumptive Negative    Methadone Screen, Urine Presumptive Negative Presumptive Negative   POCT pregnancy, urine   Result Value Ref Range    Preg Test, Ur Negative Negative   CBC and Auto Differential   Result Value Ref Range    WBC 11.2 4.5 - 13.5 x10*3/uL    nRBC 0.0 0.0 - 0.0 /100 WBCs    RBC 4.78 4.10 - 5.20 x10*6/uL    Hemoglobin 13.6 12.0 - 16.0 g/dL    Hematocrit 40.0 36.0 - 46.0 %    MCV 84 78 - 102 fL    MCH 28.5 26.0 - 34.0 pg    MCHC 34.0 31.0 - 37.0 g/dL    RDW 12.1 11.5 - 14.5 %    Platelets 299 150 - 400 x10*3/uL    Neutrophils % 61.7 33.0 - 69.0 %    Immature Granulocytes %, Automated 0.3 0.0 - 1.0 %    Lymphocytes % 22.7 28.0 - 48.0 %    Monocytes % 6.5 3.0 - 9.0 %    Eosinophils % 8.4 0.0 - 5.0 %    Basophils % 0.4 0.0 - 1.0 %    Neutrophils Absolute 6.94 1.20 - 7.70 x10*3/uL    Immature Granulocytes Absolute, Automated 0.03 0.00 - 0.10 x10*3/uL    Lymphocytes Absolute 2.55 1.80 - 4.80 x10*3/uL    Monocytes Absolute 0.73 0.10 - 1.00 x10*3/uL    Eosinophils Absolute 0.94 (H) 0.00 - 0.70 x10*3/uL    Basophils Absolute 0.05 0.00 - 0.10  x10*3/uL      Safe-T  Ability to Assess Risk Screen  Risk Screen - Ability to Assess: Able to be screened  Ask Suicide-Screening Questions  1. In the past few weeks, have you wished you were dead?: Yes  2. In the past few weeks, have you felt that you or your family would be better off if you were dead?: Yes  3. In the past week, have you been having thoughts about killing yourself?: Yes  4. Have you ever tried to kill yourself?: Yes  How did you try to kill yourself?: Taking pills  When did you try to kill yourself?: Sunday  5. Are you having thoughts of killing yourself right now?: No  Calculated Risk Score: Potential Risk  Dickson Suicide Severity Rating Scale (Screener/Recent Self-Report)  1. Wish to be Dead (Past 1 Month): Yes  2. Non-Specific Active Suicidal Thoughts (Past 1 Month): Yes  3. Active Suicidal Ideation with any Methods (Not Plan) Without Intent to Act (Past 1 Month): No  4. Active Suicidal Ideation with Some Intent to Act, Without Specific Plan (Past 1 Month): No  5. Active Suicidal Ideation with Specific Plan and Intent (Past 1 Month): No  6. Suicidal Behavior (Lifetime): Yes  6. Suicidal Behavior (3 Months): Yes  6. Suicidal Behavior (Description): Cuting  Calculated C-SSRS Risk Score (Lifetime/Recent): High Risk  Step 1: Risk Factors  Current & Past Psychiatric Dx: Mood disorder  Presenting Symptoms: Hopelessness or despair, Anxiety and/or panic  Family History: Other (Comment) (None Reported)  Precipitants/Stressors: Triggering events leading to humiliation, shame, and/or despair (e.g. loss of relationship, financial or health status) (real or anticipated)  Change in Treatment: Non-compliant or not receiving treatment  Access to Lethal Methods : No  Step 2: Protective Factors   Protective Factors Internal: Frustration tolerance, Fear of death or the actual act of killing self  Protective Factors External: Supportive social network or family or friends, Engaged in work or school  Step 3:  Suicidal Ideation Intensity  How Many Times Have You Had These Thoughts: Once a week  When You Have the Thoughts How Long do They Last : Less than 1 hour/some of the time  Could/Can You Stop Thinking About Killing Yourself or Wanting to Die if You Want to: Can control thoughts with some difficulty  Are There Things - Anyone or Anything - That Stopped You From Wanting to Die or Acting on: Deterrents probably stopped you  What Sort of Reasons Did You Have For Thinking About Wanting to Die or Killing Yourself: Mostly to end or stop the pain (you couldn't go on living with the pain or how you were feeling)  Total Score: 13  Step 5: Documentation  Risk Level: Moderate suicide risk    ASSESSMENT/PLAN    Psychiatric Risk Assessment:  Suicide Risk Assessment: age < 19 yrs old, current psychiatric illness, recent suicide attempt, suicidal behaviors, and suicidal ideations  Protective Factors against Suicide: positive family relationships  Acute Risk of Harm to Self is Considered: moderate  Violence Risk Assessment: none  Acute Risk of Harm to Others is Considered: low     Case Formulation:  Bessie Carlisle is a 13 y.o. female with no formal psychiatric history admitted to [facility] for SI with plan and worsening depression.  She reported feeling increasingly depressed, with persistent suicidal thoughts over the past two years, which she attributes to the bullying she experienced at previous school. Recently, her suicidal ideation has intensified, prompting her to formulate an active plan. She disclosed that two weeks ago, she attempted to end her life by cutting her wrist. On 10/20, she ingested approximately 7 to 8 antihistamine tablets with the intention of overdosing and mentioned that she repeated this behavior today, taking 4 more tablets of the same antihistamine, which she is prescribed for seasonal allergies and typically takes regularly.     The patient described experiencing anhedonia, feelings of guilt, lack of  motivation, decreased appetite, and insomnia with difficulty staying asleep. She denies any history of [place], auditory or paranoid thinking.  She describes auditory hallucinations persisting for years consisting of negative characteristics and commands; difficult to discern at this time whether this is psychotic features of depression versus intrusive thoughts.  She has not experienced hallucinations during hospitalization.  The patient has never consulted with a psychiatrist and has not been prescribed any psychotropic medications.  Will initiate psychotropic medication after discussing with patient and parent.  Given recent suicide attempt and need to monitor response to medications, patient continues to require inpatient level of care.     Over weekend, continue to monitor for medication tolerance, nature of hallucinations, suicidality.     Diagnostic Impression:  - Major depressive disorder, moderate, r/o psychotic features  - r/o anxiety disorder    Plan:  - Admit to [facility] for safety, stabilization, and treatment (consent obtained for admission from mother, Elena Juarez; [phone])  - Restrict to [place] and continue safety precautions as deemed appropriate by inpatient team  - Milieu therapy with group programming  - Safety: Patient appears to be moderate risk overall; able to contract for safety while on CAPU. No 1:1 sitter required.    Medications:  - PRNs as listed above in active medication orders  - Start Escitalopram 10mg po at bedtime starting 10/26     Disposition:  - Discharge trajectory expected to be: Home with guardian  - Appreciate SW assistance with discharge planning  - Will require outpatient mental health services upon discharge   - Estimated LOS: 2-3 days    Medication Consent:  Medication Consent:   Medication risks and benefits discussed with patient and guardian.  Consented to medication.     Patient seen and discussed with attending physician, Dr. Ralph Aleman, who agrees with above  plan.    Kristine Lyles D.O.  Adult Psychiatry  PGY-2  [facility]    same as pt

## 2024-11-15 ENCOUNTER — INPATIENT (INPATIENT)
Facility: HOSPITAL | Age: 39
LOS: 1 days | Discharge: HOME | End: 2024-11-17
Attending: STUDENT IN AN ORGANIZED HEALTH CARE EDUCATION/TRAINING PROGRAM | Admitting: STUDENT IN AN ORGANIZED HEALTH CARE EDUCATION/TRAINING PROGRAM
Payer: MEDICAID

## 2024-11-15 VITALS
RESPIRATION RATE: 16 BRPM | HEART RATE: 77 BPM | OXYGEN SATURATION: 100 % | TEMPERATURE: 98 F | SYSTOLIC BLOOD PRESSURE: 128 MMHG | DIASTOLIC BLOOD PRESSURE: 85 MMHG

## 2024-11-15 LAB
ALBUMIN SERPL ELPH-MCNC: 4.4 G/DL — SIGNIFICANT CHANGE UP (ref 3.3–5)
ALP SERPL-CCNC: 72 U/L — SIGNIFICANT CHANGE UP (ref 40–120)
ALT FLD-CCNC: 12 U/L — SIGNIFICANT CHANGE UP (ref 4–33)
ANION GAP SERPL CALC-SCNC: 13 MMOL/L — SIGNIFICANT CHANGE UP (ref 7–14)
APTT BLD: 31.7 SEC — SIGNIFICANT CHANGE UP (ref 24.5–35.6)
AST SERPL-CCNC: 13 U/L — SIGNIFICANT CHANGE UP (ref 4–32)
BASOPHILS # BLD AUTO: 0.06 K/UL — SIGNIFICANT CHANGE UP (ref 0–0.2)
BASOPHILS NFR BLD AUTO: 0.8 % — SIGNIFICANT CHANGE UP (ref 0–2)
BILIRUB SERPL-MCNC: 0.5 MG/DL — SIGNIFICANT CHANGE UP (ref 0.2–1.2)
BLD GP AB SCN SERPL QL: NEGATIVE — SIGNIFICANT CHANGE UP
BLOOD GAS VENOUS COMPREHENSIVE RESULT: SIGNIFICANT CHANGE UP
BUN SERPL-MCNC: 15 MG/DL — SIGNIFICANT CHANGE UP (ref 7–23)
CALCIUM SERPL-MCNC: 9.5 MG/DL — SIGNIFICANT CHANGE UP (ref 8.4–10.5)
CHLORIDE SERPL-SCNC: 101 MMOL/L — SIGNIFICANT CHANGE UP (ref 98–107)
CO2 SERPL-SCNC: 24 MMOL/L — SIGNIFICANT CHANGE UP (ref 22–31)
CREAT SERPL-MCNC: 0.67 MG/DL — SIGNIFICANT CHANGE UP (ref 0.5–1.3)
EGFR: 114 ML/MIN/1.73M2 — SIGNIFICANT CHANGE UP
EOSINOPHIL # BLD AUTO: 0.31 K/UL — SIGNIFICANT CHANGE UP (ref 0–0.5)
EOSINOPHIL NFR BLD AUTO: 3.9 % — SIGNIFICANT CHANGE UP (ref 0–6)
GLUCOSE SERPL-MCNC: 84 MG/DL — SIGNIFICANT CHANGE UP (ref 70–99)
HCG SERPL-ACNC: <1 MIU/ML — SIGNIFICANT CHANGE UP
HCT VFR BLD CALC: 40.8 % — SIGNIFICANT CHANGE UP (ref 34.5–45)
HGB BLD-MCNC: 13.8 G/DL — SIGNIFICANT CHANGE UP (ref 11.5–15.5)
IANC: 4.4 K/UL — SIGNIFICANT CHANGE UP (ref 1.8–7.4)
IMM GRANULOCYTES NFR BLD AUTO: 0.3 % — SIGNIFICANT CHANGE UP (ref 0–0.9)
INR BLD: 0.97 RATIO — SIGNIFICANT CHANGE UP (ref 0.85–1.16)
LIDOCAIN IGE QN: 20 U/L — SIGNIFICANT CHANGE UP (ref 7–60)
LYMPHOCYTES # BLD AUTO: 2.62 K/UL — SIGNIFICANT CHANGE UP (ref 1–3.3)
LYMPHOCYTES # BLD AUTO: 33 % — SIGNIFICANT CHANGE UP (ref 13–44)
MCHC RBC-ENTMCNC: 30.1 PG — SIGNIFICANT CHANGE UP (ref 27–34)
MCHC RBC-ENTMCNC: 33.8 G/DL — SIGNIFICANT CHANGE UP (ref 32–36)
MCV RBC AUTO: 89.1 FL — SIGNIFICANT CHANGE UP (ref 80–100)
MONOCYTES # BLD AUTO: 0.54 K/UL — SIGNIFICANT CHANGE UP (ref 0–0.9)
MONOCYTES NFR BLD AUTO: 6.8 % — SIGNIFICANT CHANGE UP (ref 2–14)
NEUTROPHILS # BLD AUTO: 4.4 K/UL — SIGNIFICANT CHANGE UP (ref 1.8–7.4)
NEUTROPHILS NFR BLD AUTO: 55.2 % — SIGNIFICANT CHANGE UP (ref 43–77)
NRBC # BLD: 0 /100 WBCS — SIGNIFICANT CHANGE UP (ref 0–0)
NRBC # FLD: 0 K/UL — SIGNIFICANT CHANGE UP (ref 0–0)
PLATELET # BLD AUTO: 250 K/UL — SIGNIFICANT CHANGE UP (ref 150–400)
POTASSIUM SERPL-MCNC: 3.6 MMOL/L — SIGNIFICANT CHANGE UP (ref 3.5–5.3)
POTASSIUM SERPL-SCNC: 3.6 MMOL/L — SIGNIFICANT CHANGE UP (ref 3.5–5.3)
PROT SERPL-MCNC: 8.1 G/DL — SIGNIFICANT CHANGE UP (ref 6–8.3)
PROTHROM AB SERPL-ACNC: 11.6 SEC — SIGNIFICANT CHANGE UP (ref 9.9–13.4)
RBC # BLD: 4.58 M/UL — SIGNIFICANT CHANGE UP (ref 3.8–5.2)
RBC # FLD: 12.7 % — SIGNIFICANT CHANGE UP (ref 10.3–14.5)
RH IG SCN BLD-IMP: POSITIVE — SIGNIFICANT CHANGE UP
SODIUM SERPL-SCNC: 138 MMOL/L — SIGNIFICANT CHANGE UP (ref 135–145)
WBC # BLD: 7.95 K/UL — SIGNIFICANT CHANGE UP (ref 3.8–10.5)
WBC # FLD AUTO: 7.95 K/UL — SIGNIFICANT CHANGE UP (ref 3.8–10.5)

## 2024-11-15 PROCEDURE — 74177 CT ABD & PELVIS W/CONTRAST: CPT | Mod: 26,MC

## 2024-11-15 PROCEDURE — 99285 EMERGENCY DEPT VISIT HI MDM: CPT

## 2024-11-15 RX ORDER — IOHEXOL 300 MG/ML
30 INJECTION, SOLUTION INTRAVENOUS ONCE
Refills: 0 | Status: COMPLETED | OUTPATIENT
Start: 2024-11-15 | End: 2024-11-15

## 2024-11-15 RX ORDER — SODIUM CHLORIDE 9 MG/ML
1000 INJECTION, SOLUTION INTRAMUSCULAR; INTRAVENOUS; SUBCUTANEOUS ONCE
Refills: 0 | Status: COMPLETED | OUTPATIENT
Start: 2024-11-15 | End: 2024-11-15

## 2024-11-15 RX ADMIN — IOHEXOL 30 MILLILITER(S): 300 INJECTION, SOLUTION INTRAVENOUS at 20:49

## 2024-11-15 RX ADMIN — SODIUM CHLORIDE 1000 MILLILITER(S): 9 INJECTION, SOLUTION INTRAMUSCULAR; INTRAVENOUS; SUBCUTANEOUS at 20:50

## 2024-11-15 NOTE — ED PROVIDER NOTE - PHYSICAL EXAMINATION
GEN:  Non-toxic appearing, non-distressed, speaking full sentences, non-diaphoretic, AAOx3  HEENT:  NCAT, neck supple, EOMI, PERRLA, sclera anicteric, no conjunctival pallor or injection, no stridor, normal voice, no tonsillar exudate, uvula midline  CV:  regular rhythm and rate, s1/s2 audible, no murmurs, rubs or gallops, peripheral pulses 2+ and symmetric  PULM:  non-labored respirations, lungs clear to auscultation bilaterally, no wheezes, crackles or rales  ABD:  hernia left umbilical region, tender, no overlying skin changes; no rebound, no guarding, negative Samuels's sign, bowel sounds normal, no cvat  MSK:  no gross deformity, non-tender extremities and joints, range of motion grossly normal appearing, no extremity edema, extremities warm and well perfused   NEURO:  AAOx3, CN II-XII intact, motor 5/5 in upper and lower extremities bilaterally, sensation grossly intact in extremities and trunk, finger to nose testing wnl, no nystagmus, negative Romberg, no pronator drift, no gait deficit  SKIN:  warm, dry, no rash or vesicles

## 2024-11-15 NOTE — ED ADULT NURSE REASSESSMENT NOTE - NS ED NURSE REASSESS COMMENT FT1
Pt observed alert and awake able awake able to make needs known.  Pt c/o of pain abdominal pain level 7 out 10. Pt refusing  morphine or any additional pain medications, stating the pain is not that bad .  No s/s of distress noted.

## 2024-11-15 NOTE — ED PROVIDER NOTE - CLINICAL SUMMARY MEDICAL DECISION MAKING FREE TEXT BOX
39-year-old female past medical history of lower abdominal surgery years ago (patient does not know what surgery or for what diagnosis this was performed) presents with left-sided abdominal pain starting at 7 PM last night.  Vital signs stable.  Exam consistent with likely incarcerated hernia, unable to be reduced at bedside due to size.  Will obtain surgical consult, labs, CAT scan.  Disposition pending.

## 2024-11-15 NOTE — ED PROVIDER NOTE - CADM POA PRESS ULCER
Department of Anesthesiology  Preprocedure Note       Name:  Kimmy Culp   Age:  67 y.o.  :  1956                                          MRN:  [id number]         Date:  2024      Surgeon: Surgeon(s):  Sofia Hazel MD    Procedure: Procedure(s):  COLONOSCOPY    Medications prior to admission:   Prior to Admission medications    Medication Sig Start Date End Date Taking? Authorizing Provider   amitriptyline (ELAVIL) 10 MG tablet Take 1 tablet by mouth nightly 24   Jaylin Au MD   butalbital-acetaminophen-caffeine (FIORICET, ESGIC) -40 MG per tablet Take 1 tablet by mouth every 6 hours as needed for Headaches 24   Osmin Luong DO   sodium chloride 0.9 % SOLN 100 mL with pembrolizumab 100 MG/4ML SOLN 200 mg Infuse 200 mg intravenously once    Jaylin Au MD   amLODIPine (NORVASC) 5 MG tablet Take 1 tablet by mouth daily 24   Jaylin Au MD   latanoprost (XALATAN) 0.005 % ophthalmic solution  23   Jaylin Au MD   ondansetron (ZOFRAN-ODT) 4 MG disintegrating tablet Place 1 tablet under the tongue every 8 hours as needed for Nausea or Vomiting  Patient not taking: Reported on 8/10/2023 8/9/23   Hayes Genao MD   prochlorperazine (COMPAZINE) 10 MG tablet Take 0.5 tablets by mouth every 6 hours as needed (for nausea)  Patient not taking: Reported on 8/10/2023 8/9/23   Hayes Genao MD   lidocaine-prilocaine (EMLA) 2.5-2.5 % cream Apply topically as needed.  Patient not taking: Reported on 3/19/2024 8/9/23   Hayes Genao MD   Enoxaparin Sodium 40 MG/0.4ML PSKT Inject as directed  Patient not taking: Reported on 8/10/2023    Jaylin Au MD   simethicone (MYLICON) 80 MG chewable tablet Take 1 tablet by mouth every 6 hours as needed for Flatulence  Patient not taking: Reported on 2024   Ton, Mary N, APRN - NP   losartan (COZAAR) 25 MG tablet Take 1 tablet by mouth daily    Provider, MD Jaylin  No

## 2024-11-15 NOTE — ED PROVIDER NOTE - OBJECTIVE STATEMENT
39-year-old female past medical history of lower abdominal surgery years ago (patient does not know what surgery or for what diagnosis this was performed) presents with left-sided abdominal pain starting at 7 PM last night.  Patient reports an associated lump in the area of pain.  Denies fever, chills, nausea, vomiting.  Reports normal bowel movements and passage of flatus.

## 2024-11-15 NOTE — ED ADULT NURSE NOTE - OBJECTIVE STATEMENT
39 year old female brought to room 6. Pt c/o pain to umbilical area, reports she has hernia. denies n/v, diarrhea, constipation.  report given from nurse , patient laying in semi fowlers position on the stretcher. patient alert and oriented times four. patient denies shortness of breath, chest pain, nausea, vomiting, chill, fever. Respirations equal and adequate. Patients IV patent, no signs of infiltration. Safety measures in place, call bell within reach. Patient stable upon leaving the room.

## 2024-11-15 NOTE — ED PROVIDER NOTE - PROGRESS NOTE DETAILS
BIBI:  Surgery consulted and to see patient. Manuel Smith DO: received s/o on pt. pt stable. accepted to surg.

## 2024-11-16 ENCOUNTER — TRANSCRIPTION ENCOUNTER (OUTPATIENT)
Age: 39
End: 2024-11-16

## 2024-11-16 DIAGNOSIS — R10.9 UNSPECIFIED ABDOMINAL PAIN: ICD-10-CM

## 2024-11-16 PROBLEM — Z78.9 OTHER SPECIFIED HEALTH STATUS: Chronic | Status: ACTIVE | Noted: 2024-02-20

## 2024-11-16 LAB
ANION GAP SERPL CALC-SCNC: 12 MMOL/L — SIGNIFICANT CHANGE UP (ref 7–14)
BLD GP AB SCN SERPL QL: NEGATIVE — SIGNIFICANT CHANGE UP
BUN SERPL-MCNC: 12 MG/DL — SIGNIFICANT CHANGE UP (ref 7–23)
CALCIUM SERPL-MCNC: 8.7 MG/DL — SIGNIFICANT CHANGE UP (ref 8.4–10.5)
CHLORIDE SERPL-SCNC: 104 MMOL/L — SIGNIFICANT CHANGE UP (ref 98–107)
CO2 SERPL-SCNC: 24 MMOL/L — SIGNIFICANT CHANGE UP (ref 22–31)
CREAT SERPL-MCNC: 0.66 MG/DL — SIGNIFICANT CHANGE UP (ref 0.5–1.3)
EGFR: 114 ML/MIN/1.73M2 — SIGNIFICANT CHANGE UP
GLUCOSE SERPL-MCNC: 85 MG/DL — SIGNIFICANT CHANGE UP (ref 70–99)
HCT VFR BLD CALC: 38.7 % — SIGNIFICANT CHANGE UP (ref 34.5–45)
HGB BLD-MCNC: 13.3 G/DL — SIGNIFICANT CHANGE UP (ref 11.5–15.5)
MAGNESIUM SERPL-MCNC: 2.1 MG/DL — SIGNIFICANT CHANGE UP (ref 1.6–2.6)
MCHC RBC-ENTMCNC: 30.8 PG — SIGNIFICANT CHANGE UP (ref 27–34)
MCHC RBC-ENTMCNC: 34.4 G/DL — SIGNIFICANT CHANGE UP (ref 32–36)
MCV RBC AUTO: 89.6 FL — SIGNIFICANT CHANGE UP (ref 80–100)
NRBC # BLD: 0 /100 WBCS — SIGNIFICANT CHANGE UP (ref 0–0)
NRBC # FLD: 0 K/UL — SIGNIFICANT CHANGE UP (ref 0–0)
PHOSPHATE SERPL-MCNC: 2.5 MG/DL — SIGNIFICANT CHANGE UP (ref 2.5–4.5)
PLATELET # BLD AUTO: 228 K/UL — SIGNIFICANT CHANGE UP (ref 150–400)
POTASSIUM SERPL-MCNC: 3.9 MMOL/L — SIGNIFICANT CHANGE UP (ref 3.5–5.3)
POTASSIUM SERPL-SCNC: 3.9 MMOL/L — SIGNIFICANT CHANGE UP (ref 3.5–5.3)
RBC # BLD: 4.32 M/UL — SIGNIFICANT CHANGE UP (ref 3.8–5.2)
RBC # FLD: 12.8 % — SIGNIFICANT CHANGE UP (ref 10.3–14.5)
RH IG SCN BLD-IMP: POSITIVE — SIGNIFICANT CHANGE UP
SODIUM SERPL-SCNC: 140 MMOL/L — SIGNIFICANT CHANGE UP (ref 135–145)
WBC # BLD: 5.63 K/UL — SIGNIFICANT CHANGE UP (ref 3.8–10.5)
WBC # FLD AUTO: 5.63 K/UL — SIGNIFICANT CHANGE UP (ref 3.8–10.5)

## 2024-11-16 DEVICE — MESH HERNIA VENTRAL SYMBOTEX COMPOSITE ROUND 12CM: Type: IMPLANTABLE DEVICE | Status: FUNCTIONAL

## 2024-11-16 RX ORDER — ACETAMINOPHEN 500MG 500 MG/1
975 TABLET, COATED ORAL EVERY 6 HOURS
Refills: 0 | Status: DISCONTINUED | OUTPATIENT
Start: 2024-11-16 | End: 2024-11-17

## 2024-11-16 RX ORDER — ENOXAPARIN SODIUM 30 MG/.3ML
40 INJECTION SUBCUTANEOUS EVERY 24 HOURS
Refills: 0 | Status: DISCONTINUED | OUTPATIENT
Start: 2024-11-16 | End: 2024-11-17

## 2024-11-16 RX ORDER — HYDROMORPHONE HYDROCHLORIDE 2 MG/1
0.2 TABLET ORAL EVERY 4 HOURS
Refills: 0 | Status: DISCONTINUED | OUTPATIENT
Start: 2024-11-16 | End: 2024-11-17

## 2024-11-16 RX ORDER — ONDANSETRON HYDROCHLORIDE 4 MG/1
4 TABLET, FILM COATED ORAL ONCE
Refills: 0 | Status: DISCONTINUED | OUTPATIENT
Start: 2024-11-16 | End: 2024-11-16

## 2024-11-16 RX ORDER — ACETAMINOPHEN 500MG 500 MG/1
1000 TABLET, COATED ORAL ONCE
Refills: 0 | Status: COMPLETED | OUTPATIENT
Start: 2024-11-16 | End: 2024-11-16

## 2024-11-16 RX ORDER — 0.9 % SODIUM CHLORIDE 0.9 %
1000 INTRAVENOUS SOLUTION INTRAVENOUS
Refills: 0 | Status: DISCONTINUED | OUTPATIENT
Start: 2024-11-16 | End: 2024-11-16

## 2024-11-16 RX ORDER — HYDROMORPHONE HYDROCHLORIDE 2 MG/1
0.5 TABLET ORAL
Refills: 0 | Status: DISCONTINUED | OUTPATIENT
Start: 2024-11-16 | End: 2024-11-16

## 2024-11-16 RX ORDER — OXYCODONE HYDROCHLORIDE 30 MG/1
5 TABLET ORAL ONCE
Refills: 0 | Status: DISCONTINUED | OUTPATIENT
Start: 2024-11-16 | End: 2024-11-16

## 2024-11-16 RX ORDER — HYDROMORPHONE HYDROCHLORIDE 2 MG/1
0.5 TABLET ORAL EVERY 4 HOURS
Refills: 0 | Status: DISCONTINUED | OUTPATIENT
Start: 2024-11-16 | End: 2024-11-17

## 2024-11-16 RX ORDER — INFLUENZA VIRUS VACCINE 15; 15; 15; 15 UG/.5ML; UG/.5ML; UG/.5ML; UG/.5ML
0.5 SUSPENSION INTRAMUSCULAR ONCE
Refills: 0 | Status: DISCONTINUED | OUTPATIENT
Start: 2024-11-16 | End: 2024-11-17

## 2024-11-16 RX ORDER — ACETAMINOPHEN 500MG 500 MG/1
1000 TABLET, COATED ORAL EVERY 6 HOURS
Refills: 0 | Status: DISCONTINUED | OUTPATIENT
Start: 2024-11-16 | End: 2024-11-16

## 2024-11-16 RX ORDER — KETOROLAC TROMETHAMINE 30 MG/ML
15 INJECTION INTRAMUSCULAR; INTRAVENOUS EVERY 6 HOURS
Refills: 0 | Status: DISCONTINUED | OUTPATIENT
Start: 2024-11-16 | End: 2024-11-17

## 2024-11-16 RX ADMIN — Medication 100 MILLILITER(S): at 12:24

## 2024-11-16 RX ADMIN — KETOROLAC TROMETHAMINE 15 MILLIGRAM(S): 30 INJECTION INTRAMUSCULAR; INTRAVENOUS at 22:10

## 2024-11-16 RX ADMIN — ACETAMINOPHEN 500MG 400 MILLIGRAM(S): 500 TABLET, COATED ORAL at 12:24

## 2024-11-16 RX ADMIN — ACETAMINOPHEN 500MG 400 MILLIGRAM(S): 500 TABLET, COATED ORAL at 05:18

## 2024-11-16 RX ADMIN — Medication 100 MILLILITER(S): at 04:49

## 2024-11-16 RX ADMIN — ACETAMINOPHEN 500MG 400 MILLIGRAM(S): 500 TABLET, COATED ORAL at 18:17

## 2024-11-16 RX ADMIN — ACETAMINOPHEN 500MG 1000 MILLIGRAM(S): 500 TABLET, COATED ORAL at 18:45

## 2024-11-16 RX ADMIN — ENOXAPARIN SODIUM 40 MILLIGRAM(S): 30 INJECTION SUBCUTANEOUS at 12:24

## 2024-11-16 NOTE — PATIENT PROFILE ADULT - FALL HARM RISK - UNIVERSAL INTERVENTIONS
Bed in lowest position, wheels locked, appropriate side rails in place/Call bell, personal items and telephone in reach/Instruct patient to call for assistance before getting out of bed or chair/Non-slip footwear when patient is out of bed/[place] to call system/Physically safe environment - no spills, clutter or unnecessary equipment/Purposeful Proactive Rounding/Room/bathroom lighting operational, light cord in reach

## 2024-11-16 NOTE — H&P ADULT - HISTORY OF PRESENT ILLNESS
38 yo Korean-speaking F w/ no significant PMHx, PSHx of unknwon gynecologic surgery (via pfannenstiel incision) presents to the ED w/ 1 day so L sided abdominal buldge and pain. Tolerating PO, no N/V, passing flatus, normal BM.     Patient states known buldge that would come in and out freely for years, now won't go back in.     In the ED, AVSS. Labs unremarkable. CT A/P w/ PO and IV contrast shows small to moderate supraumbilical ventral hernia containing mildly infiltrated omentum. Hernia measures 1.3cm at the neck.     Surgery consulted for evaluation.

## 2024-11-16 NOTE — PATIENT PROFILE ADULT - DO YOU FEEL UNSAFE AT HOME, WORK, OR SCHOOL?
no no Hydroxyzine Pregnancy And Lactation Text: This medication is not safe during pregnancy and should not be taken. It is also excreted in breast milk and breast feeding isn't recommended.

## 2024-11-16 NOTE — ED ADULT NURSE REASSESSMENT NOTE - NS ED NURSE REASSESS COMMENT FT1
report given to MAT Aden.   patient laying in semi fowlers position on the stretcher. patient alert and oriented times four. patient denies shortness of breath, chest pain, nausea, vomiting, chill, fever. Respirations equal and adequate. Patients IV patent, no signs of infiltration. Safety measures in place, call bell within reach. Patient stable upon leaving the room.

## 2024-11-16 NOTE — BRIEF OPERATIVE NOTE - OPERATION/FINDINGS
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 Contents reduced. Hernia repaired primarily with v-lock. Symbotex mesh placed in IPOM fashion.

## 2024-11-16 NOTE — H&P ADULT - ASSESSMENT
38 yo Greek-speaking F w/ no significant PMHx, PSHx of unknwon gynecologic surgery (via pfannenstiel incision) presents to the ED w/ 1 day so L sided abdominal buldge and pain found to have supraumbilical ventral hernia containing omentum and fat.    Plan:   - Admit to A team surgery under Dr. Clark  - Add-on to OR for lap ventral hernia repair, poss robo, poss open poss mesh       - Consent obtained, located in chart  - Diet: NPO/IVF  - Pain control PRN   - DVT ppx: LVX  - No home meds  - Dispo: OR vs surgical floor     Discussed w/ attending     A Team Surgery   [id number]

## 2024-11-16 NOTE — PRE-OP CHECKLIST - IDENTIFICATION BAND VERIFIED
Problem: SAFETY ADULT - FALL  Goal: Free from fall injury  Description: INTERVENTIONS:  - Assess pt frequently for physical needs  - Identify cognitive and physical deficits and behaviors that affect risk of falls.  - Lindsay fall precautions as indicated by assessment.  - Educate pt/family on patient safety including physical limitations  - Instruct pt to call for assistance with activity based on assessment  - Modify environment to reduce risk of injury  - Provide assistive devices as appropriate  - Consider OT/PT consult to assist with strengthening/mobility  - Encourage toileting schedule  Outcome: Progressing     Problem: SKIN/TISSUE INTEGRITY - ADULT  Goal: Skin integrity remains intact  Description: INTERVENTIONS  - Assess and document risk factors for pressure ulcer development  - Assess and document skin integrity  - Monitor for areas of redness and/or skin breakdown  - Initiate interventions, skin care algorithm/standards of care as needed  Outcome: Progressing  Goal: Incision(s), wounds(s) or drain site(s) healing without S/S of infection  Description: INTERVENTIONS:  - Assess and document risk factors for pressure ulcer development  - Assess and document skin integrity  - Assess and document dressing/incision, wound bed, drain sites and surrounding tissue  - Implement wound care per orders  - Initiate isolation precautions as appropriate  - Initiate Pressure Ulcer prevention bundle as indicated  Outcome: Progressing  Goal: Oral mucous membranes remain intact  Description: INTERVENTIONS  - Assess oral mucosa and hygiene practices  - Implement preventative oral hygiene regimen  - Implement oral medicated treatments as ordered  Outcome: Progressing     Problem: MUSCULOSKELETAL - ADULT  Goal: Return mobility to safest level of function  Description: INTERVENTIONS:  - Assess patient stability and activity tolerance for standing, transferring and ambulating w/ or w/o assistive devices  - Assist with  transfers and ambulation using safe patient handling equipment as needed  - Ensure adequate protection for wounds/incisions during mobilization  - Obtain PT/OT consults as needed  - Advance activity as appropriate  - Communicate ordered activity level and limitations with patient/family  Outcome: Progressing  Goal: Maintain proper alignment of affected body part  Description: INTERVENTIONS:  - Support and protect limb and body alignment per provider's orders  - Instruct and reinforce with patient and family use of appropriate assistive device and precautions (e.g. spinal or hip dislocation precautions)  Outcome: Progressing     Problem: NEUROLOGICAL - ADULT  Goal: Achieves stable or improved neurological status  Description: INTERVENTIONS  - Assess for and report changes in neurological status  - Initiate measures to prevent increased intracranial pressure  - Maintain blood pressure and fluid volume within ordered parameters to optimize cerebral perfusion and minimize risk of hemorrhage  - Monitor temperature, glucose, and sodium. Initiate appropriate interventions as ordered  Outcome: Progressing     Problem: Impaired Functional Mobility  Goal: Achieve highest/safest level of mobility/gait  Description: Interventions:  - Assess patient's functional ability and stability  - Promote increasing activity/tolerance for mobility and gait  - Educate and engage patient/family in tolerated activity level and precautions  - Recommend use of  RW for transfers and ambulation  Outcome: Progressing      done

## 2024-11-16 NOTE — H&P ADULT - NSHPLABSRESULTS_GEN_ALL_CORE
13.8   7.95  )-----------( 250      ( 15 Nov 2024 21:03 )             40.8       11-15    138  |  101  |  15  ----------------------------<  84  3.6   |  24  |  0.67    Ca    9.5      15 Nov 2024 21:03    TPro  8.1  /  Alb  4.4  /  TBili  0.5  /  DBili  x   /  AST  13  /  ALT  12  /  AlkPhos  72  11-15

## 2024-11-16 NOTE — CHART NOTE - NSCHARTNOTEFT_GEN_A_CORE
POST-OP NOTE    PHILIP HANSON | [id number] | [facility] PAC 30    Procedure: s/p robo ventral hernia repair with peritoneal mesh onlay     Subjective: Patient recovering well in p[acu complains of mild richa incisional abdominal pain and a headache.    Vital Signs Last 24 Hrs  T(C): 36.8 (16 Nov 2024 17:15), Max: 37.1 (16 Nov 2024 04:23)  T(F): 98.3 (16 Nov 2024 17:15), Max: 98.7 (16 Nov 2024 04:23)  HR: 83 (16 Nov 2024 18:00) (71 - 94)  BP: 115/76 (16 Nov 2024 18:00) (104/66 - 134/90)  BP(mean): 89 (16 Nov 2024 18:00) (78 - 94)  RR: 12 (16 Nov 2024 18:00) (10 - 18)  SpO2: 98% (16 Nov 2024 18:00) (97% - 100%)    Parameters below as of 16 Nov 2024 17:15  Patient On (Oxygen Delivery Method): nasal cannula      I&O's Summary    15 Nov 2024 07:01  -  16 Nov 2024 07:00  --------------------------------------------------------  IN: 200 mL / OUT: 500 mL / NET: -300 mL    16 Nov 2024 07:01  -  16 Nov 2024 18:38  --------------------------------------------------------  IN: 800 mL / OUT: 0 mL / NET: 800 mL                            13.3   5.63  )-----------( 228      ( 16 Nov 2024 05:25 )             38.7     11-16    140  |  104  |  12  ----------------------------<  85  3.9   |  24  |  0.66    Ca    8.7      16 Nov 2024 05:25  Phos  2.5     11-16  Mg     2.10     11-16    TPro  8.1  /  Alb  4.4  /  TBili  0.5  /  DBili  x   /  AST  13  /  ALT  12  /  AlkPhos  72  11-15   PT/INR - ( 15 Nov 2024 21:03 )   PT: 11.6 sec;   INR: 0.97 ratio         PTT - ( 15 Nov 2024 21:03 )  PTT:31.7 sec    PHYSICAL EXAM:  Gen: NAD  Resp: breathing easily, no stridor  CV: RRR  Abdomen: soft, nontender, nondistended  Skin: Incision c/d/i. Normal color, no rashes or lesions      Assessment:   38 yo female s/p robo ventral hernia repair with mesh onlay recovering well.     plan:  - lovenox  - tylenol toradol for pain  - reg diet  - can go if voids

## 2024-11-16 NOTE — ED ADULT NURSE REASSESSMENT NOTE - NS ED NURSE REASSESS COMMENT FT1
Pt alert and awake able to make needs known. Pt has  abdominal pain level 6 out 10, stating it is not that  bad . Pt refusing pain medications. NPO status maintained. Pt last meal 11/15 at noon.  Pt awaiting surgical consultation .   No s/s of distress noted.

## 2024-11-16 NOTE — H&P ADULT - NSHPPHYSICALEXAM_GEN_ALL_CORE
GEN: NAD, resting quietly  NEURO: AAOx3, no focal deficits  PULM: symmetric chest rise bilaterally, no increased WOB  CV: appears well perfused   ABD: Soft, nondistended, buldge to the L periumbilical region mildly tender to palpation. Prior pfannenstiel incision well-healed without rebound tenderness/guarding/rigidity  EXTR: no cyanosis or edema, moving all extremities

## 2024-11-16 NOTE — H&P ADULT - ATTENDING COMMENTS
DATE OF SERVICE: 11-16-24 @ 13:39    39F with known ventral hernia, present at least 3 years per pt, here with pain and tenderness at hernia site x1d. No F/C/N/V  VSS  Labs NL  CT omentum containing hernia  Abd soft, incarcerated ventral hernia, TTP over hernia without skin changes    Will proceed to OR for VHR  NPO  R/B/A explained with Welsh , pt agrees to proceed

## 2024-11-16 NOTE — H&P ADULT - SOCIAL HISTORY
No
Sweating Severity Scale: 2- The sweating is tolerable but sometimes interferes with daily activities
How Severe Is It?: moderate
Is This A New Presentation, Or A Follow-Up?: Follow Up Hyperhidrosis

## 2024-11-16 NOTE — BRIEF OPERATIVE NOTE - NSICDXBRIEFPROCEDURE_GEN_ALL_CORE_FT
PROCEDURES:  Reconstruction, abdominal wall, robot-assisted, using mesh 16-Nov-2024 17:33:09  Lily Palencia

## 2024-11-17 VITALS
HEART RATE: 88 BPM | SYSTOLIC BLOOD PRESSURE: 102 MMHG | OXYGEN SATURATION: 98 % | DIASTOLIC BLOOD PRESSURE: 60 MMHG | RESPIRATION RATE: 18 BRPM

## 2024-11-17 LAB
ANION GAP SERPL CALC-SCNC: 16 MMOL/L — HIGH (ref 7–14)
BUN SERPL-MCNC: 15 MG/DL — SIGNIFICANT CHANGE UP (ref 7–23)
CALCIUM SERPL-MCNC: 8.4 MG/DL — SIGNIFICANT CHANGE UP (ref 8.4–10.5)
CHLORIDE SERPL-SCNC: 103 MMOL/L — SIGNIFICANT CHANGE UP (ref 98–107)
CO2 SERPL-SCNC: 20 MMOL/L — LOW (ref 22–31)
CREAT SERPL-MCNC: 0.64 MG/DL — SIGNIFICANT CHANGE UP (ref 0.5–1.3)
EGFR: 115 ML/MIN/1.73M2 — SIGNIFICANT CHANGE UP
GLUCOSE SERPL-MCNC: 89 MG/DL — SIGNIFICANT CHANGE UP (ref 70–99)
HCT VFR BLD CALC: 36.7 % — SIGNIFICANT CHANGE UP (ref 34.5–45)
HGB BLD-MCNC: 12.1 G/DL — SIGNIFICANT CHANGE UP (ref 11.5–15.5)
MAGNESIUM SERPL-MCNC: 2.1 MG/DL — SIGNIFICANT CHANGE UP (ref 1.6–2.6)
MCHC RBC-ENTMCNC: 30.1 PG — SIGNIFICANT CHANGE UP (ref 27–34)
MCHC RBC-ENTMCNC: 33 G/DL — SIGNIFICANT CHANGE UP (ref 32–36)
MCV RBC AUTO: 91.3 FL — SIGNIFICANT CHANGE UP (ref 80–100)
NRBC # BLD: 0 /100 WBCS — SIGNIFICANT CHANGE UP (ref 0–0)
NRBC # FLD: 0 K/UL — SIGNIFICANT CHANGE UP (ref 0–0)
PHOSPHATE SERPL-MCNC: 3.7 MG/DL — SIGNIFICANT CHANGE UP (ref 2.5–4.5)
PLATELET # BLD AUTO: 232 K/UL — SIGNIFICANT CHANGE UP (ref 150–400)
POTASSIUM SERPL-MCNC: 4.2 MMOL/L — SIGNIFICANT CHANGE UP (ref 3.5–5.3)
POTASSIUM SERPL-SCNC: 4.2 MMOL/L — SIGNIFICANT CHANGE UP (ref 3.5–5.3)
RBC # BLD: 4.02 M/UL — SIGNIFICANT CHANGE UP (ref 3.8–5.2)
RBC # FLD: 12.6 % — SIGNIFICANT CHANGE UP (ref 10.3–14.5)
SODIUM SERPL-SCNC: 139 MMOL/L — SIGNIFICANT CHANGE UP (ref 135–145)
WBC # BLD: 6.99 K/UL — SIGNIFICANT CHANGE UP (ref 3.8–10.5)
WBC # FLD AUTO: 6.99 K/UL — SIGNIFICANT CHANGE UP (ref 3.8–10.5)

## 2024-11-17 RX ORDER — OXYCODONE HYDROCHLORIDE 30 MG/1
2.5 TABLET ORAL EVERY 6 HOURS
Refills: 0 | Status: DISCONTINUED | OUTPATIENT
Start: 2024-11-17 | End: 2024-11-17

## 2024-11-17 RX ORDER — IBUPROFEN 200 MG
1 TABLET ORAL
Qty: 12 | Refills: 0
Start: 2024-11-17 | End: 2024-11-19

## 2024-11-17 RX ORDER — OXYCODONE HYDROCHLORIDE 30 MG/1
5 TABLET ORAL EVERY 6 HOURS
Refills: 0 | Status: DISCONTINUED | OUTPATIENT
Start: 2024-11-17 | End: 2024-11-17

## 2024-11-17 RX ORDER — OXYCODONE HYDROCHLORIDE 30 MG/1
1 TABLET ORAL
Qty: 12 | Refills: 0
Start: 2024-11-17 | End: 2024-11-19

## 2024-11-17 RX ORDER — ACETAMINOPHEN 500MG 500 MG/1
3 TABLET, COATED ORAL
Qty: 0 | Refills: 0 | DISCHARGE
Start: 2024-11-17

## 2024-11-17 RX ADMIN — OXYCODONE HYDROCHLORIDE 2.5 MILLIGRAM(S): 30 TABLET ORAL at 11:00

## 2024-11-17 RX ADMIN — ENOXAPARIN SODIUM 40 MILLIGRAM(S): 30 INJECTION SUBCUTANEOUS at 12:33

## 2024-11-17 RX ADMIN — KETOROLAC TROMETHAMINE 15 MILLIGRAM(S): 30 INJECTION INTRAMUSCULAR; INTRAVENOUS at 00:38

## 2024-11-17 RX ADMIN — ACETAMINOPHEN 500MG 975 MILLIGRAM(S): 500 TABLET, COATED ORAL at 06:46

## 2024-11-17 RX ADMIN — ACETAMINOPHEN 500MG 975 MILLIGRAM(S): 500 TABLET, COATED ORAL at 12:33

## 2024-11-17 RX ADMIN — OXYCODONE HYDROCHLORIDE 2.5 MILLIGRAM(S): 30 TABLET ORAL at 10:12

## 2024-11-17 NOTE — DISCHARGE NOTE PROVIDER - HOSPITAL COURSE
38 yo female w/ no significant PMHx, PSHx of unknwon gynecologic surgery (via pfannenstiel incision) presents to the ED w/ 1 day so L sided abdominal buldge and pain. Tolerating PO, no N/V, passing flatus, normal BM. Patient states known buldge that would come in and out freely for years, now won't go back in.   In the ED, AVSS. Labs unremarkable. CT A/P w/ PO and IV contrast shows small to moderate supraumbilical ventral hernia containing mildly infiltrated omentum. Hernia measures 1.3cm at the neck.   Patient was admitted to colorectal surgery under Dr. Clark who preformed a robot assisted ventral hernia repair with intraperitoneal onlay mesh.  POD 1 Patient is voiding, tolerating a diet and ambulating. Pain is well controlled. She is ready for discharge.

## 2024-11-17 NOTE — DISCHARGE NOTE NURSING/CASE MANAGEMENT/SOCIAL WORK - NSDCPNINST_GEN_ALL_CORE
Seek medical attention immediately if you have any shortness of breath, trouble swallowing or are grasping for air.  Watch out for the following signs of infections such as fever( temperature above 100.3),chills, colored drainage. If there is any signs of bleeding, bruises that do not go away let your provider know about them. If you have ongoing nausea, vomiting or diarrhea contact your provider. When taking narcotic medication do not operate any machinery. If your pain is not managed with the prescribed medication make sure to let your provider know.

## 2024-11-17 NOTE — DISCHARGE NOTE PROVIDER - NSDCCPCAREPLAN_GEN_ALL_CORE_FT
PRINCIPAL DISCHARGE DIAGNOSIS  Diagnosis: Incarcerated ventral hernia  Assessment and Plan of Treatment:

## 2024-11-17 NOTE — PROGRESS NOTE ADULT - ATTENDING COMMENTS
DATE OF SERVICE: 11-17-24 @ 10:27    POD 1 s/p robotic repair of incarcerated ventral hernia  Minimal pain, reports some soreness  Labs WNL  Abd soft, NT/ND    DC home today  FU ~2 wk

## 2024-11-17 NOTE — DISCHARGE NOTE PROVIDER - CARE PROVIDER_API CALL
Moses Clark (DO)  Surgery  [address], [address]  [place] [address]  Phone: [phone]  Fax: [phone]  Follow Up Time: 2 weeks

## 2024-11-17 NOTE — PROGRESS NOTE ADULT - SUBJECTIVE AND OBJECTIVE BOX
Subjective:  Patient seen at bedside this AM. Reports feeling well, without complaints. Denies chest pain, SOB. Tolerating diet without N/V.     24h Events:   - Overnight, no acute events    Objective:    Physical Exam:  GEN: resting in bed comfortably in NAD  RESP: no increased WOB  ABD: soft, non-distended, non-tender to palpation without rebound tenderness or guarding  EXTR: warm, well-perfused without gross deformities; spontaneous movement in b/l U/L extrem  NEURO: awake, alert    Vital Signs  T(C): 36.7 (11-17 @ 06:00), Max: 36.9 (11-16 @ 13:01)  HR: 74 (11-17 @ 06:00) (71 - 94)  BP: 100/65 (11-17 @ 06:00) (100/65 - 121/71)  RR: 18 (11-17 @ 06:00) (10 - 18)  SpO2: 97% (11-17 @ 06:00) (96% - 99%)  11-16-24 @ 07:01  -  11-17-24 @ 07:00  --------------------------------------------------------  IN:  Total IN: 0 mL    OUT:    Voided (mL): 350 mL  Total OUT: 350 mL    Total NET: -350 mL          Labs:                        12.1   6.99  )-----------( 232      ( 17 Nov 2024 05:30 )             36.7   11-17    139  |  103  |  15  ----------------------------<  89  4.2   |  20[L]  |  0.64    Ca    8.4      17 Nov 2024 05:30  Phos  3.7     11-17  Mg     2.10     11-17    TPro  8.1  /  Alb  4.4  /  TBili  0.5  /  DBili  x   /  AST  13  /  ALT  12  /  AlkPhos  72  11-15    CAPILLARY BLOOD GLUCOSE          Medications:  MEDICATIONS  (STANDING):  acetaminophen     Tablet .. 975 milliGRAM(s) Oral every 6 hours  enoxaparin Injectable 40 milliGRAM(s) SubCutaneous every 24 hours  influenza   Vaccine 0.5 milliLiter(s) IntraMuscular once    MEDICATIONS  (PRN):  oxyCODONE    IR 2.5 milliGRAM(s) Oral every 6 hours PRN Moderate Pain (4 - 6)  oxyCODONE    IR 5 milliGRAM(s) Oral every 6 hours PRN Severe Pain (7 - 10)      Imaging:

## 2024-11-17 NOTE — DISCHARGE NOTE NURSING/CASE MANAGEMENT/SOCIAL WORK - NSDPLANGINTID_GEN_ALL_CORE
Neuropsych   Paul, Gregory Gallo, Franke     OR    Neurology  Mary Queen, specializes in concussion    All are [facility] providers.     Mom is asking which one Dr. Lescher prefer she take pt to.     Per Mom, ok to leave message on Moms machine if she does not answer.    Please advise.    tba

## 2024-11-17 NOTE — DISCHARGE NOTE NURSING/CASE MANAGEMENT/SOCIAL WORK - PATIENT PORTAL LINK FT
You can access the FollowMyHealth Patient Portal offered by [facility] by registering at the following website: [email or web address]. By joining [facility]’s FollowMyHealth portal, you will also be able to view your health information using other applications (apps) compatible with our system.

## 2024-11-17 NOTE — DISCHARGE NOTE PROVIDER - NSDCMRMEDTOKEN_GEN_ALL_CORE_FT
acetaminophen 325 mg oral tablet: 3 tab(s) orally every 6 hours  ibuprofen 400 mg oral tablet: 1 tab(s) orally 4 times a day  oxyCODONE 5 mg oral tablet: 1 tab(s) orally every 6 hours as needed for Severe Pain (7 - 10) MDD: 4   acetaminophen 325 mg oral tablet: 3 tab(s) orally every 6 hours  oxyCODONE 5 mg oral tablet: 1 tab(s) orally every 6 hours as needed for Severe Pain (7 - 10) MDD: 4

## 2024-11-17 NOTE — DISCHARGE NOTE PROVIDER - NSDCCPTREATMENT_GEN_ALL_CORE_FT
PRINCIPAL PROCEDURE  Procedure: Repair, hernia, ventral, robot-assisted, laparoscopic, using component separation technique, using da Miles Xi, with mesh insertion  Findings and Treatment:

## 2024-11-17 NOTE — PROGRESS NOTE ADULT - ASSESSMENT
Assessment :    40 yo female pod 1 s/p robotic ventral hernia repair of incarcerated fat. Patient is recovering well. voiding appropriatly, tolerating her diet. She is ready for discharge.    Plan:  - sqh  - reg diet  - DC today with prn oxy

## 2024-11-17 NOTE — DISCHARGE NOTE PROVIDER - NSDCFUADDINST_GEN_ALL_CORE_FT
Diet: You may resume your regular diet  Activity: You may resume your activity as tolerated, No heavy lifting (greater than 20 pounds) until your follow up appointment.   Wound care: you may shower when you get home, allow warm soap and water to wash over your incisions, do not scrub vigorously.   Pain: You may take alternate between Tylenol and Ibuprofen as needed for pain every 3 hours between the two. A prescription for oxycodone was sent to your pharmacy.   Follow up: Follow up with your surgeon in 2 weeks.

## 2024-11-17 NOTE — DISCHARGE NOTE NURSING/CASE MANAGEMENT/SOCIAL WORK - FINANCIAL ASSISTANCE
[facility] provides services at a reduced cost to those who are determined to be eligible through [facility]’s financial assistance program. Information regarding [facility]’s financial assistance program can be found by going to [email or web address] or by calling [phone].

## 2025-05-20 NOTE — DISCHARGE NOTE OB - FALL HARM RISK - PT AGE POPULATION HIDDEN
Quality 226: Preventive Care And Screening: Tobacco Use: Screening And Cessation Intervention: Patient screened for tobacco use and is an ex/non-smoker Detail Level: Generalized Adult

## (undated) DEVICE — PACK MINOR WITH LAP

## (undated) DEVICE — BASIN SET DOUBLE

## (undated) DEVICE — SUT VICRYL 0 27" CT-1 UNDYED

## (undated) DEVICE — SOL IRR POUR H2O 500ML

## (undated) DEVICE — DRAPE LAPAROTOMY TRANSVERSE

## (undated) DEVICE — DRSG STERISTRIPS 0.5 X 4"

## (undated) DEVICE — WARMING BLANKET FULL UNDERBODY

## (undated) DEVICE — SYR LUER LOK 20CC

## (undated) DEVICE — DRSG DERMABOND 0.7ML

## (undated) DEVICE — DRSG TEGADERM 4 X 4.75"

## (undated) DEVICE — SUT VICRYL 3-0 27" SH

## (undated) DEVICE — SUT VICRYL 2-0 27" CT-1 UNDYED

## (undated) DEVICE — SUT VICRYL 3-0 18" TIES UNDYED

## (undated) DEVICE — SUT PROLENE 2-0 30" CT-2

## (undated) DEVICE — VENODYNE/SCD SLEEVE CALF MEDIUM

## (undated) DEVICE — DRAPE SOL WARMING 44X44IN

## (undated) DEVICE — SUT PDS II 2-0 27" CT-1

## (undated) DEVICE — LIJ-ESU BOVIE MACHINE - ROBOTIC 11469375: Type: DURABLE MEDICAL EQUIPMENT

## (undated) DEVICE — SUT VICRYL 0 18" TIES UNDYED

## (undated) DEVICE — POSITIONER STRAP ARMBOARD VELCRO TS-30

## (undated) DEVICE — ELCTR BOVIE PENCIL SMOKE EVACUATION

## (undated) DEVICE — SUT VICRYL 4-0 18" PS-2 UNDYED

## (undated) DEVICE — ELCTR GROUNDING PAD ADULT COVIDIEN